# Patient Record
Sex: MALE | Race: WHITE | Employment: FULL TIME | URBAN - METROPOLITAN AREA
[De-identification: names, ages, dates, MRNs, and addresses within clinical notes are randomized per-mention and may not be internally consistent; named-entity substitution may affect disease eponyms.]

---

## 2017-02-06 ENCOUNTER — OFFICE VISIT (OUTPATIENT)
Dept: NEPHROLOGY | Facility: CLINIC | Age: 42
End: 2017-02-06

## 2017-02-06 VITALS
WEIGHT: 189.19 LBS | HEIGHT: 73.5 IN | SYSTOLIC BLOOD PRESSURE: 120 MMHG | HEART RATE: 71 BPM | BODY MASS INDEX: 24.54 KG/M2 | DIASTOLIC BLOOD PRESSURE: 79 MMHG

## 2017-02-06 DIAGNOSIS — Z00.00 HEALTH CARE MAINTENANCE: Primary | ICD-10-CM

## 2017-02-06 PROCEDURE — 99396 PREV VISIT EST AGE 40-64: CPT | Performed by: INTERNAL MEDICINE

## 2017-02-06 RX ORDER — MONTELUKAST SODIUM 10 MG/1
TABLET ORAL
Qty: 90 TABLET | Refills: 1 | Status: SHIPPED | OUTPATIENT
Start: 2017-02-06 | End: 2017-08-08

## 2017-02-06 NOTE — PATIENT INSTRUCTIONS
1. Good to see you Mariya Lora     2. Please get labs fasting    3. I called in singulair    4. See me one year. Olga Duffy

## 2017-02-07 NOTE — PROGRESS NOTES
NEPHROLOGY PROGRESS NOTE  Cornelio Morales     MRN:  [id number]   Date of Service:  2/6/17     HISTORY OF PRESENT ILLNESS: Audrey Vasquez is here for yearly physical. He is a healthy 48-year-old male. He is a nurse. He has history of asthma.  There is colon cancer

## 2017-02-27 ENCOUNTER — APPOINTMENT (OUTPATIENT)
Dept: LAB | Age: 42
End: 2017-02-27
Attending: INTERNAL MEDICINE
Payer: COMMERCIAL

## 2017-02-27 ENCOUNTER — TELEPHONE (OUTPATIENT)
Dept: NEPHROLOGY | Facility: CLINIC | Age: 42
End: 2017-02-27

## 2017-02-27 DIAGNOSIS — Z00.00 HEALTH CARE MAINTENANCE: ICD-10-CM

## 2017-02-27 LAB
ALBUMIN SERPL BCP-MCNC: 4.5 G/DL (ref 3.5–4.8)
ALBUMIN/GLOB SERPL: 1.5 {RATIO} (ref 1–2)
ALP SERPL-CCNC: 75 U/L (ref 32–100)
ALT SERPL-CCNC: 42 U/L (ref 17–63)
ANION GAP SERPL CALC-SCNC: 10 MMOL/L (ref 0–18)
AST SERPL-CCNC: 25 U/L (ref 15–41)
BASOPHILS # BLD: 0 K/UL (ref 0–0.2)
BASOPHILS NFR BLD: 1 %
BILIRUB SERPL-MCNC: 1 MG/DL (ref 0.3–1.2)
BILIRUB UR QL: NEGATIVE
BUN SERPL-MCNC: 6 MG/DL (ref 8–20)
BUN/CREAT SERPL: 8.8 (ref 10–20)
CALCIUM SERPL-MCNC: 9.7 MG/DL (ref 8.5–10.5)
CHLORIDE SERPL-SCNC: 98 MMOL/L (ref 95–110)
CHOLEST SERPL-MCNC: 180 MG/DL (ref 110–200)
CLARITY UR: CLEAR
CO2 SERPL-SCNC: 28 MMOL/L (ref 22–32)
COLOR UR: YELLOW
CREAT SERPL-MCNC: 0.68 MG/DL (ref 0.5–1.5)
EOSINOPHIL # BLD: 0 K/UL (ref 0–0.7)
EOSINOPHIL NFR BLD: 1 %
ERYTHROCYTE [DISTWIDTH] IN BLOOD BY AUTOMATED COUNT: 13.4 % (ref 11–15)
GLOBULIN PLAS-MCNC: 3.1 G/DL (ref 2.5–3.7)
GLUCOSE SERPL-MCNC: 100 MG/DL (ref 70–99)
GLUCOSE UR-MCNC: NEGATIVE MG/DL
HCT VFR BLD AUTO: 48.1 % (ref 41–52)
HDLC SERPL-MCNC: 44 MG/DL
HGB BLD-MCNC: 16.3 G/DL (ref 13.5–17.5)
HGB UR QL STRIP.AUTO: NEGATIVE
KETONES UR-MCNC: NEGATIVE MG/DL
LDLC SERPL CALC-MCNC: 91 MG/DL (ref 0–99)
LEUKOCYTE ESTERASE UR QL STRIP.AUTO: NEGATIVE
LYMPHOCYTES # BLD: 1.2 K/UL (ref 1–4)
LYMPHOCYTES NFR BLD: 15 %
MCH RBC QN AUTO: 29.4 PG (ref 27–32)
MCHC RBC AUTO-ENTMCNC: 33.9 G/DL (ref 32–37)
MCV RBC AUTO: 86.7 FL (ref 80–100)
MONOCYTES # BLD: 0.6 K/UL (ref 0–1)
MONOCYTES NFR BLD: 7 %
NEUTROPHILS # BLD AUTO: 5.9 K/UL (ref 1.8–7.7)
NEUTROPHILS NFR BLD: 77 %
NITRITE UR QL STRIP.AUTO: NEGATIVE
NONHDLC SERPL-MCNC: 136 MG/DL
OSMOLALITY UR CALC.SUM OF ELEC: 280 MOSM/KG (ref 275–295)
PH UR: 5 [PH] (ref 5–8)
PLATELET # BLD AUTO: 223 K/UL (ref 140–400)
PMV BLD AUTO: 8.7 FL (ref 7.4–10.3)
POTASSIUM SERPL-SCNC: 3.7 MMOL/L (ref 3.3–5.1)
PROT SERPL-MCNC: 7.6 G/DL (ref 5.9–8.4)
PROT UR-MCNC: NEGATIVE MG/DL
PSA SERPL-MCNC: 0.5 NG/ML (ref 0–4)
RBC # BLD AUTO: 5.55 M/UL (ref 4.5–5.9)
SODIUM SERPL-SCNC: 136 MMOL/L (ref 136–144)
SP GR UR STRIP: 1.01 (ref 1–1.03)
TRIGL SERPL-MCNC: 225 MG/DL (ref 1–149)
UROBILINOGEN UR STRIP-ACNC: <2
VIT C UR-MCNC: NEGATIVE MG/DL
WBC # BLD AUTO: 7.7 K/UL (ref 4–11)

## 2017-02-27 PROCEDURE — 80061 LIPID PANEL: CPT | Performed by: INTERNAL MEDICINE

## 2017-02-27 PROCEDURE — 81003 URINALYSIS AUTO W/O SCOPE: CPT | Performed by: INTERNAL MEDICINE

## 2017-02-27 PROCEDURE — 85025 COMPLETE CBC W/AUTO DIFF WBC: CPT | Performed by: INTERNAL MEDICINE

## 2017-02-27 PROCEDURE — 80053 COMPREHEN METABOLIC PANEL: CPT | Performed by: INTERNAL MEDICINE

## 2017-02-27 PROCEDURE — 36415 COLL VENOUS BLD VENIPUNCTURE: CPT

## 2017-02-27 RX ORDER — SIMVASTATIN 20 MG
TABLET ORAL
Qty: 90 TABLET | Refills: 1 | Status: SHIPPED | OUTPATIENT
Start: 2017-02-27 | End: 2017-08-30

## 2017-02-27 NOTE — TELEPHONE ENCOUNTER
Pt. states that he is missing page 3 of his lab orders, and he would like to get his labs done today at [address] [place] [address]. Please call to discuss.

## 2017-03-14 ENCOUNTER — TELEPHONE (OUTPATIENT)
Dept: NEPHROLOGY | Facility: CLINIC | Age: 42
End: 2017-03-14

## 2017-04-17 RX ORDER — LISINOPRIL AND HYDROCHLOROTHIAZIDE 20; 12.5 MG/1; MG/1
TABLET ORAL
Qty: 90 TABLET | Refills: 0 | Status: SHIPPED | OUTPATIENT
Start: 2017-04-17 | End: 2017-07-21

## 2017-07-21 RX ORDER — LISINOPRIL AND HYDROCHLOROTHIAZIDE 20; 12.5 MG/1; MG/1
TABLET ORAL
Qty: 90 TABLET | Refills: 1 | Status: SHIPPED | OUTPATIENT
Start: 2017-07-21 | End: 2018-01-12

## 2017-08-08 RX ORDER — MONTELUKAST SODIUM 10 MG/1
TABLET ORAL
Qty: 90 TABLET | Refills: 1 | Status: SHIPPED | OUTPATIENT
Start: 2017-08-08 | End: 2018-02-03

## 2017-08-30 RX ORDER — SIMVASTATIN 20 MG
TABLET ORAL
Qty: 90 TABLET | Refills: 1 | Status: SHIPPED | OUTPATIENT
Start: 2017-08-30 | End: 2018-02-26

## 2018-01-15 RX ORDER — LISINOPRIL AND HYDROCHLOROTHIAZIDE 20; 12.5 MG/1; MG/1
TABLET ORAL
Qty: 90 TABLET | Refills: 1 | Status: SHIPPED | OUTPATIENT
Start: 2018-01-15 | End: 2018-07-11

## 2018-02-05 RX ORDER — MONTELUKAST SODIUM 10 MG/1
TABLET ORAL
Qty: 90 TABLET | Refills: 0 | Status: SHIPPED | OUTPATIENT
Start: 2018-02-05 | End: 2018-05-10

## 2018-02-05 NOTE — TELEPHONE ENCOUNTER
LOV 2/6/17. Has upcoming appt on 3/15/18. Rx refilled per [facility] protocol in his absence.

## 2018-02-27 RX ORDER — SIMVASTATIN 20 MG
TABLET ORAL
Qty: 90 TABLET | Refills: 0 | Status: SHIPPED | OUTPATIENT
Start: 2018-02-27 | End: 2018-06-02

## 2018-03-15 ENCOUNTER — OFFICE VISIT (OUTPATIENT)
Dept: NEPHROLOGY | Facility: CLINIC | Age: 43
End: 2018-03-15

## 2018-03-15 VITALS
WEIGHT: 192 LBS | SYSTOLIC BLOOD PRESSURE: 126 MMHG | HEIGHT: 73 IN | BODY MASS INDEX: 25.45 KG/M2 | DIASTOLIC BLOOD PRESSURE: 76 MMHG | HEART RATE: 58 BPM

## 2018-03-15 DIAGNOSIS — Z00.00 ROUTINE ADULT HEALTH MAINTENANCE: Primary | ICD-10-CM

## 2018-03-15 PROCEDURE — 99396 PREV VISIT EST AGE 40-64: CPT | Performed by: INTERNAL MEDICINE

## 2018-03-15 NOTE — PROGRESS NOTES
Tatyana Cotter is here for his yearly checkup is 36years old he is recently lost 3 people in his life but they were stepparents and grandmother.   He is feeling good he is working at [employer] has no specific complaints  Denies any chest pain shortness of

## 2018-03-15 NOTE — PATIENT INSTRUCTIONS
Good to see you Volanda Paget   good job w everything     see me one year     see Dermatology   At [facility]      Get labs fasting same day. Feng Ledesma

## 2018-03-29 ENCOUNTER — LAB ENCOUNTER (OUTPATIENT)
Dept: LAB | Age: 43
End: 2018-03-29
Attending: INTERNAL MEDICINE
Payer: COMMERCIAL

## 2018-03-29 DIAGNOSIS — Z00.00 ROUTINE ADULT HEALTH MAINTENANCE: ICD-10-CM

## 2018-03-29 LAB
ALBUMIN SERPL BCP-MCNC: 4.2 G/DL (ref 3.5–4.8)
ALBUMIN/GLOB SERPL: 1.5 {RATIO} (ref 1–2)
ALP SERPL-CCNC: 66 U/L (ref 32–100)
ALT SERPL-CCNC: 27 U/L (ref 17–63)
ANION GAP SERPL CALC-SCNC: 7 MMOL/L (ref 0–18)
AST SERPL-CCNC: 22 U/L (ref 15–41)
BASOPHILS # BLD: 0 K/UL (ref 0–0.2)
BASOPHILS NFR BLD: 1 %
BILIRUB SERPL-MCNC: 0.9 MG/DL (ref 0.3–1.2)
BILIRUB UR QL: NEGATIVE
BUN SERPL-MCNC: 9 MG/DL (ref 8–20)
BUN/CREAT SERPL: 11.3 (ref 10–20)
CALCIUM SERPL-MCNC: 9.4 MG/DL (ref 8.5–10.5)
CHLORIDE SERPL-SCNC: 102 MMOL/L (ref 95–110)
CHOLEST SERPL-MCNC: 161 MG/DL (ref 110–200)
CLARITY UR: CLEAR
CO2 SERPL-SCNC: 29 MMOL/L (ref 22–32)
COLOR UR: YELLOW
CREAT SERPL-MCNC: 0.8 MG/DL (ref 0.5–1.5)
EOSINOPHIL # BLD: 0.1 K/UL (ref 0–0.7)
EOSINOPHIL NFR BLD: 3 %
ERYTHROCYTE [DISTWIDTH] IN BLOOD BY AUTOMATED COUNT: 13.3 % (ref 11–15)
GLOBULIN PLAS-MCNC: 2.8 G/DL (ref 2.5–3.7)
GLUCOSE SERPL-MCNC: 91 MG/DL (ref 70–99)
GLUCOSE UR-MCNC: NEGATIVE MG/DL
HCT VFR BLD AUTO: 46 % (ref 41–52)
HDLC SERPL-MCNC: 50 MG/DL
HGB BLD-MCNC: 16 G/DL (ref 13.5–17.5)
HGB UR QL STRIP.AUTO: NEGATIVE
KETONES UR-MCNC: NEGATIVE MG/DL
LDLC SERPL CALC-MCNC: 88 MG/DL (ref 0–99)
LEUKOCYTE ESTERASE UR QL STRIP.AUTO: NEGATIVE
LYMPHOCYTES # BLD: 1.4 K/UL (ref 1–4)
LYMPHOCYTES NFR BLD: 24 %
MCH RBC QN AUTO: 30.1 PG (ref 27–32)
MCHC RBC AUTO-ENTMCNC: 34.8 G/DL (ref 32–37)
MCV RBC AUTO: 86.5 FL (ref 80–100)
MONOCYTES # BLD: 0.7 K/UL (ref 0–1)
MONOCYTES NFR BLD: 12 %
NEUTROPHILS # BLD AUTO: 3.4 K/UL (ref 1.8–7.7)
NEUTROPHILS NFR BLD: 61 %
NITRITE UR QL STRIP.AUTO: NEGATIVE
NONHDLC SERPL-MCNC: 111 MG/DL
OSMOLALITY UR CALC.SUM OF ELEC: 284 MOSM/KG (ref 275–295)
PATIENT FASTING: YES
PH UR: 6 [PH] (ref 5–8)
PLATELET # BLD AUTO: 213 K/UL (ref 140–400)
PMV BLD AUTO: 9.1 FL (ref 7.4–10.3)
POTASSIUM SERPL-SCNC: 4 MMOL/L (ref 3.3–5.1)
PROT SERPL-MCNC: 7 G/DL (ref 5.9–8.4)
PROT UR-MCNC: NEGATIVE MG/DL
PSA SERPL-MCNC: 0.5 NG/ML (ref 0–4)
RBC # BLD AUTO: 5.32 M/UL (ref 4.5–5.9)
SODIUM SERPL-SCNC: 138 MMOL/L (ref 136–144)
SP GR UR STRIP: 1.01 (ref 1–1.03)
TRIGL SERPL-MCNC: 114 MG/DL (ref 1–149)
TSH SERPL-ACNC: 1.97 UIU/ML (ref 0.45–5.33)
UROBILINOGEN UR STRIP-ACNC: <2
VIT C UR-MCNC: NEGATIVE MG/DL
WBC # BLD AUTO: 5.6 K/UL (ref 4–11)

## 2018-03-29 PROCEDURE — 85025 COMPLETE CBC W/AUTO DIFF WBC: CPT

## 2018-03-29 PROCEDURE — 80061 LIPID PANEL: CPT

## 2018-03-29 PROCEDURE — 81003 URINALYSIS AUTO W/O SCOPE: CPT

## 2018-03-29 PROCEDURE — 36415 COLL VENOUS BLD VENIPUNCTURE: CPT

## 2018-03-29 PROCEDURE — 84443 ASSAY THYROID STIM HORMONE: CPT

## 2018-03-29 PROCEDURE — 80050 GENERAL HEALTH PANEL: CPT

## 2018-05-11 RX ORDER — MONTELUKAST SODIUM 10 MG/1
TABLET ORAL
Qty: 90 TABLET | Refills: 3 | Status: SHIPPED | OUTPATIENT
Start: 2018-05-11 | End: 2019-05-03

## 2018-06-04 RX ORDER — SIMVASTATIN 20 MG
TABLET ORAL
Qty: 90 TABLET | Refills: 1 | Status: SHIPPED | OUTPATIENT
Start: 2018-06-04 | End: 2018-11-30

## 2018-07-11 RX ORDER — LISINOPRIL AND HYDROCHLOROTHIAZIDE 20; 12.5 MG/1; MG/1
TABLET ORAL
Qty: 90 TABLET | Refills: 1 | Status: SHIPPED | OUTPATIENT
Start: 2018-07-11 | End: 2019-01-19

## 2018-12-03 RX ORDER — SIMVASTATIN 20 MG
TABLET ORAL
Qty: 90 TABLET | Refills: 1 | Status: SHIPPED | OUTPATIENT
Start: 2018-12-03 | End: 2019-05-15

## 2019-01-21 RX ORDER — LISINOPRIL AND HYDROCHLOROTHIAZIDE 20; 12.5 MG/1; MG/1
TABLET ORAL
Qty: 90 TABLET | Refills: 0 | Status: SHIPPED | OUTPATIENT
Start: 2019-01-21 | End: 2019-04-16

## 2019-03-15 ENCOUNTER — OFFICE VISIT (OUTPATIENT)
Dept: NEPHROLOGY | Facility: CLINIC | Age: 44
End: 2019-03-15
Payer: COMMERCIAL

## 2019-03-15 VITALS
HEIGHT: 73 IN | SYSTOLIC BLOOD PRESSURE: 135 MMHG | BODY MASS INDEX: 25.58 KG/M2 | HEART RATE: 72 BPM | DIASTOLIC BLOOD PRESSURE: 87 MMHG | WEIGHT: 193 LBS

## 2019-03-15 DIAGNOSIS — Z00.00 HEALTHCARE MAINTENANCE: Primary | ICD-10-CM

## 2019-03-15 PROCEDURE — 99396 PREV VISIT EST AGE 40-64: CPT | Performed by: INTERNAL MEDICINE

## 2019-03-15 NOTE — PATIENT INSTRUCTIONS
GREAT JOB KVNG    SEE ME ONE YEAR     KEEP UP GOOD WORK/    TRY FLONASE RATHER THAN AFRIN FOR NOSE      DO LABS FASTING

## 2019-03-15 NOTE — PROGRESS NOTES
Tameka Alfredo is here and is doing well he has no complaints he is a healthy 41-year-old white male he is up-to-date with immunizations he works as a nurse denies any bowel or bladder issues chest pain shortness of breath urinary issues or skin issues denies chest

## 2019-04-04 ENCOUNTER — LAB ENCOUNTER (OUTPATIENT)
Dept: LAB | Age: 44
End: 2019-04-04
Attending: INTERNAL MEDICINE
Payer: COMMERCIAL

## 2019-04-04 DIAGNOSIS — Z00.00 HEALTHCARE MAINTENANCE: ICD-10-CM

## 2019-04-16 RX ORDER — LISINOPRIL AND HYDROCHLOROTHIAZIDE 20; 12.5 MG/1; MG/1
TABLET ORAL
Qty: 90 TABLET | Refills: 3 | Status: SHIPPED | OUTPATIENT
Start: 2019-04-16 | End: 2020-04-21

## 2019-04-17 ENCOUNTER — TELEPHONE (OUTPATIENT)
Dept: NEPHROLOGY | Facility: CLINIC | Age: 44
End: 2019-04-17

## 2019-04-17 DIAGNOSIS — R74.01 TRANSAMINASEMIA: Primary | ICD-10-CM

## 2019-05-07 RX ORDER — MONTELUKAST SODIUM 10 MG/1
TABLET ORAL
Qty: 90 TABLET | Refills: 3 | Status: SHIPPED | OUTPATIENT
Start: 2019-05-07 | End: 2020-06-08

## 2019-05-15 RX ORDER — SIMVASTATIN 20 MG
TABLET ORAL
Qty: 90 TABLET | Refills: 1 | Status: SHIPPED | OUTPATIENT
Start: 2019-05-15 | End: 2019-12-09

## 2019-05-15 NOTE — TELEPHONE ENCOUNTER
LOV 3/15/19. Last lipid panel was done on 4/4/19. Refill pended and routed to Dr. Alexander Martino.

## 2019-05-31 ENCOUNTER — TELEPHONE (OUTPATIENT)
Dept: NEPHROLOGY | Facility: CLINIC | Age: 44
End: 2019-05-31

## 2019-10-19 ENCOUNTER — WALK IN (OUTPATIENT)
Dept: URGENT CARE | Age: 44
End: 2019-10-19

## 2019-10-19 VITALS
OXYGEN SATURATION: 97 % | HEART RATE: 85 BPM | RESPIRATION RATE: 18 BRPM | TEMPERATURE: 97.7 F | DIASTOLIC BLOOD PRESSURE: 72 MMHG | SYSTOLIC BLOOD PRESSURE: 120 MMHG

## 2019-10-19 DIAGNOSIS — J06.9 VIRAL UPPER RESPIRATORY TRACT INFECTION: Primary | ICD-10-CM

## 2019-10-19 PROCEDURE — 99203 OFFICE O/P NEW LOW 30 MIN: CPT | Performed by: NURSE PRACTITIONER

## 2019-10-19 RX ORDER — BENZONATATE 100 MG/1
100 CAPSULE ORAL 3 TIMES DAILY PRN
Qty: 15 CAPSULE | Refills: 0 | Status: SHIPPED | OUTPATIENT
Start: 2019-10-19 | End: 2019-10-24

## 2019-10-19 ASSESSMENT — ENCOUNTER SYMPTOMS
SORE THROAT: 0
GASTROINTESTINAL NEGATIVE: 1
SINUS PAIN: 0
TROUBLE SWALLOWING: 0
PSYCHIATRIC NEGATIVE: 1
FEVER: 0
EYE PAIN: 0
SINUS PRESSURE: 0
COUGH: 1
FATIGUE: 0
CHILLS: 0
SHORTNESS OF BREATH: 0

## 2019-10-24 ENCOUNTER — HOSPITAL ENCOUNTER (OUTPATIENT)
Age: 44
Discharge: HOME | End: 2019-10-24
Attending: FAMILY MEDICINE
Payer: COMMERCIAL

## 2019-10-24 ENCOUNTER — APPOINTMENT (OUTPATIENT)
Dept: GENERAL RADIOLOGY | Age: 44
End: 2019-10-24
Attending: FAMILY MEDICINE
Payer: COMMERCIAL

## 2019-10-24 ENCOUNTER — TELEPHONE (OUTPATIENT)
Dept: NEPHROLOGY | Facility: CLINIC | Age: 44
End: 2019-10-24

## 2019-10-24 VITALS
HEART RATE: 87 BPM | SYSTOLIC BLOOD PRESSURE: 141 MMHG | HEIGHT: 74 IN | TEMPERATURE: 98 F | BODY MASS INDEX: 24.38 KG/M2 | RESPIRATION RATE: 18 BRPM | WEIGHT: 190 LBS | DIASTOLIC BLOOD PRESSURE: 98 MMHG | OXYGEN SATURATION: 96 %

## 2019-10-24 DIAGNOSIS — J06.9 VIRAL UPPER RESPIRATORY TRACT INFECTION: Primary | ICD-10-CM

## 2019-10-24 PROCEDURE — 71046 X-RAY EXAM CHEST 2 VIEWS: CPT | Performed by: FAMILY MEDICINE

## 2019-10-24 PROCEDURE — 99213 OFFICE O/P EST LOW 20 MIN: CPT

## 2019-10-24 PROCEDURE — 99204 OFFICE O/P NEW MOD 45 MIN: CPT

## 2019-10-24 RX ORDER — ALBUTEROL SULFATE 90 UG/1
2 AEROSOL, METERED RESPIRATORY (INHALATION) EVERY 4 HOURS PRN
Qty: 1 INHALER | Refills: 1 | Status: SHIPPED | OUTPATIENT
Start: 2019-10-24 | End: 2019-11-23

## 2019-10-24 RX ORDER — METHYLPREDNISOLONE 4 MG/1
TABLET ORAL
Qty: 1 PACKAGE | Refills: 0 | Status: SHIPPED | OUTPATIENT
Start: 2019-10-24 | End: 2019-11-05 | Stop reason: ALTCHOICE

## 2019-10-24 NOTE — TELEPHONE ENCOUNTER
Patient contacted. Was sick 2 weeks ago but still has a cough that continues.  Yellow secretions and admits to having chest tightness and is short of breath when walking up stairs (exertion) Using Delsym cough medication which helps but just doesn't feel we

## 2019-10-24 NOTE — ED PROVIDER NOTES
Patient Seen in: [address]      History   Patient presents with:  Cough/URI    Stated Complaint: cough; fever; congestion; sob    HPI    36yo M presents to IC with 2 weeks of cough.  Had myalgias and fevers at the onset Physical Exam  Vitals signs and nursing note reviewed. Constitutional:       General: He is not in acute distress. Appearance: He is not ill-appearing, toxic-appearing or diaphoretic.       Comments: NAD, nontoxic, breathing easily   HENT: viral process versus bronchiolitis.  No acute pulmonary consolidation           Dictated by (CST): Carroll Barrera MD on 10/24/2019 at 14:43       Approved by (CST): Carroll Barrera MD on 10/24/2019 at 14:46                    Narrative:    Abdoul Yarbrough Wheezing.   Qty: 1 Inhaler Refills: 1

## 2019-10-24 NOTE — TELEPHONE ENCOUNTER
Patient contacted with Dr. Rodney Cates advice. Patient is currently at work but did note that there is an [facility] [place] in [facility] and he will go there to be evaluated.

## 2019-10-24 NOTE — ED INITIAL ASSESSMENT (HPI)
Pt states a few weeks ago had a cold pt states having body aches at the time. Pt states has since subsided but now having a cough with chest congestion.  Pt went to [address] last Saturday was given cough suppressant but told to follow up if cough persist. Pt sta

## 2019-11-04 ENCOUNTER — TELEPHONE (OUTPATIENT)
Dept: NEPHROLOGY | Facility: CLINIC | Age: 44
End: 2019-11-04

## 2019-11-04 NOTE — TELEPHONE ENCOUNTER
Contacted pt. He was seen in immediate care on 10/24/19 and prescribed a medrol dose pack and albuterol inhaler. He finished medrol dose pack and is still using albuterol inhaler PRN.  He has a productive cough and if he's walking long distances or up stair

## 2019-11-04 NOTE — TELEPHONE ENCOUNTER
Pt states that he went to [facility] on 10//24/19 and was given RX for steroids/albuterol inhaler. Pt still has cough w/yellow phlegm. Pt would like to know what he should do. Please call. Aware [facility] out office.

## 2019-11-05 ENCOUNTER — TELEPHONE (OUTPATIENT)
Dept: INTERNAL MEDICINE CLINIC | Facility: CLINIC | Age: 44
End: 2019-11-05

## 2019-11-05 ENCOUNTER — OFFICE VISIT (OUTPATIENT)
Dept: FAMILY MEDICINE CLINIC | Facility: CLINIC | Age: 44
End: 2019-11-05
Payer: COMMERCIAL

## 2019-11-05 VITALS
SYSTOLIC BLOOD PRESSURE: 112 MMHG | BODY MASS INDEX: 25.18 KG/M2 | TEMPERATURE: 97 F | DIASTOLIC BLOOD PRESSURE: 85 MMHG | HEART RATE: 86 BPM | HEIGHT: 73 IN | WEIGHT: 190 LBS | OXYGEN SATURATION: 97 % | RESPIRATION RATE: 20 BRPM

## 2019-11-05 DIAGNOSIS — R05.8 PRODUCTIVE COUGH: Primary | ICD-10-CM

## 2019-11-05 PROCEDURE — 99213 OFFICE O/P EST LOW 20 MIN: CPT | Performed by: FAMILY MEDICINE

## 2019-11-05 RX ORDER — AZITHROMYCIN 250 MG/1
TABLET, FILM COATED ORAL
Qty: 6 TABLET | Refills: 0 | Status: SHIPPED | OUTPATIENT
Start: 2019-11-05 | End: 2020-11-23

## 2019-11-05 NOTE — TELEPHONE ENCOUNTER
New patient states he has been having difficulty breathing as well as coughing.     Transferred to Triage

## 2019-11-05 NOTE — PROGRESS NOTES
HPI:    Lord Clifford is a 39year old male presents to clinic with a 4-week history of symptoms. Initially, patient reports fevers, congestion, and fatigue.   He then developed a cough which started out dry, is now productive with thick yellow phl BP: 112/85   Pulse: 86   Resp: 20   Temp: 97.4 °F (36.3 °C)   TempSrc: Oral   SpO2: 97%   Weight: 190 lb (86.2 kg)   Height: 6' 1\" (1.854 m)     Physical Exam   Constitutional: No distress. HENT:   Head: Normocephalic and atraumatic.    Neck: Normal ra

## 2019-11-05 NOTE — TELEPHONE ENCOUNTER
Reason for Call/Symptoms: Cough/Congestion  Onset: 2 Weeks  Courtesy Assessment: Patient reports ongoing cough/congestion with yellow phlegm. Has wheezing with extended activity, walking, using stairs. Was seen in [facility] 10/24 CXR normal, put on steroids.  Ilene Gil

## 2019-12-09 RX ORDER — SIMVASTATIN 20 MG
TABLET ORAL
Qty: 90 TABLET | Refills: 4 | Status: SHIPPED | OUTPATIENT
Start: 2019-12-09 | End: 2020-12-15

## 2019-12-09 NOTE — TELEPHONE ENCOUNTER
LOV 3/15/19. Last lipid panel was done on 4/4/19.  RTC in 1 yr (3/2020) Refill pended and routed to Dr. Gregorio Maddox for approval.

## 2020-01-20 ENCOUNTER — TELEPHONE (OUTPATIENT)
Dept: NEPHROLOGY | Facility: CLINIC | Age: 45
End: 2020-01-20

## 2020-01-20 RX ORDER — LEVOFLOXACIN 500 MG/1
500 TABLET, FILM COATED ORAL DAILY
Qty: 7 TABLET | Refills: 0 | Status: SHIPPED | OUTPATIENT
Start: 2020-01-20 | End: 2020-11-23

## 2020-01-20 NOTE — TELEPHONE ENCOUNTER
Pt notified that [facility] has called in 1 week course of Levaquin and to call office if symptoms do not improve.

## 2020-01-20 NOTE — TELEPHONE ENCOUNTER
Pt returned call. Reports productive cough with yellow and brown chunky phlegm and congestion. Onset 1 week ago. Feels like symptoms migrated from sinuses to chest. Denies wheezing, SOB, or difficulty breathing. Denies fever.  Had similar symptoms in Novemb

## 2020-01-29 ENCOUNTER — TELEPHONE (OUTPATIENT)
Dept: NEPHROLOGY | Facility: CLINIC | Age: 45
End: 2020-01-29

## 2020-01-30 RX ORDER — PREDNISONE 20 MG/1
20 TABLET ORAL 2 TIMES DAILY
Qty: 10 TABLET | Refills: 0 | Status: SHIPPED | OUTPATIENT
Start: 2020-01-30 | End: 2020-02-04

## 2020-02-10 ENCOUNTER — OFFICE VISIT (OUTPATIENT)
Dept: NEPHROLOGY | Facility: CLINIC | Age: 45
End: 2020-02-10
Payer: COMMERCIAL

## 2020-02-10 ENCOUNTER — HOSPITAL ENCOUNTER (OUTPATIENT)
Dept: GENERAL RADIOLOGY | Facility: HOSPITAL | Age: 45
Discharge: HOME | End: 2020-02-10
Attending: INTERNAL MEDICINE
Payer: COMMERCIAL

## 2020-02-10 VITALS
HEART RATE: 69 BPM | WEIGHT: 200 LBS | HEIGHT: 73 IN | DIASTOLIC BLOOD PRESSURE: 90 MMHG | BODY MASS INDEX: 26.51 KG/M2 | TEMPERATURE: 98 F | SYSTOLIC BLOOD PRESSURE: 145 MMHG

## 2020-02-10 DIAGNOSIS — J18.9 PNEUMONIA DUE TO INFECTIOUS ORGANISM, UNSPECIFIED LATERALITY, UNSPECIFIED PART OF LUNG: Primary | ICD-10-CM

## 2020-02-10 DIAGNOSIS — J18.9 PNEUMONIA DUE TO INFECTIOUS ORGANISM, UNSPECIFIED LATERALITY, UNSPECIFIED PART OF LUNG: ICD-10-CM

## 2020-02-10 PROCEDURE — 99213 OFFICE O/P EST LOW 20 MIN: CPT | Performed by: INTERNAL MEDICINE

## 2020-02-10 PROCEDURE — 99212 OFFICE O/P EST SF 10 MIN: CPT | Performed by: INTERNAL MEDICINE

## 2020-02-10 PROCEDURE — 71046 X-RAY EXAM CHEST 2 VIEWS: CPT | Performed by: INTERNAL MEDICINE

## 2020-02-10 RX ORDER — LEVOFLOXACIN 500 MG/1
500 TABLET, FILM COATED ORAL DAILY
Qty: 10 TABLET | Refills: 0 | Status: SHIPPED | OUTPATIENT
Start: 2020-02-10 | End: 2020-11-23

## 2020-02-10 RX ORDER — CODEINE PHOSPHATE AND GUAIFENESIN 10; 100 MG/5ML; MG/5ML
5 SOLUTION ORAL EVERY 6 HOURS PRN
Qty: 120 ML | Refills: 1 | Status: SHIPPED | OUTPATIENT
Start: 2020-02-10 | End: 2020-11-23

## 2020-02-12 ENCOUNTER — TELEPHONE (OUTPATIENT)
Dept: NEPHROLOGY | Facility: CLINIC | Age: 45
End: 2020-02-12

## 2020-02-12 NOTE — TELEPHONE ENCOUNTER
Spoke with pt and verbalizes understanding of Dr. Terrance Russell note. Pt states he still feels sick. Advised pt to take antibiotics and cough meds as prescribed and he can call us again next week if he's not feeling any better.

## 2020-02-13 NOTE — PROGRESS NOTES
Lindsay Benedict is here he has been having recurrent bronchitis he is taking a Z-Rafi and steroids but no good relief no fever no shortness of breath coughing up green thick mucus    Currently afebrile pressure 145/90  Room air saturation 98%  Neck supple no adenop

## 2020-03-02 ENCOUNTER — TELEPHONE (OUTPATIENT)
Dept: NEPHROLOGY | Facility: CLINIC | Age: 45
End: 2020-03-02

## 2020-03-02 NOTE — TELEPHONE ENCOUNTER
Discussed Dr. Cam Ahumada orders w/ pt. Scheduled pt 3/5 1:15 pm [facility]. Appt info including provider name given. He voiced understanding. Dr. Violeta Hua- Shellie Moctezuma.

## 2020-03-02 NOTE — TELEPHONE ENCOUNTER
Routed to Pulmonary staff to see about an appointment for this patient at Dr. Randal Oliveira request.

## 2020-03-02 NOTE — TELEPHONE ENCOUNTER
Patient has had a cough for more than 1 month and has had several rounds of antibiotics.   Patient wondering if there is something else he can take/it's just not going away

## 2020-03-02 NOTE — TELEPHONE ENCOUNTER
Discussed Dr. Ana Correia orders below w/ pt. He declined appt on 3/4 2:30 pm w/ Dr. Hayley Leyva d/t scheduling conflict (has to p/u his son from school).  Explained no other openings this wk, will discuss Dr. Ana Correia request w/ pulmonary physicians, & f/u once resp

## 2020-03-05 ENCOUNTER — OFFICE VISIT (OUTPATIENT)
Dept: PULMONOLOGY | Facility: CLINIC | Age: 45
End: 2020-03-05
Payer: COMMERCIAL

## 2020-03-05 VITALS
OXYGEN SATURATION: 96 % | BODY MASS INDEX: 26.24 KG/M2 | HEART RATE: 84 BPM | HEIGHT: 73 IN | WEIGHT: 198 LBS | DIASTOLIC BLOOD PRESSURE: 91 MMHG | RESPIRATION RATE: 18 BRPM | SYSTOLIC BLOOD PRESSURE: 136 MMHG

## 2020-03-05 DIAGNOSIS — R05.9 COUGH: Primary | ICD-10-CM

## 2020-03-05 PROCEDURE — 99212 OFFICE O/P EST SF 10 MIN: CPT | Performed by: INTERNAL MEDICINE

## 2020-03-05 PROCEDURE — 99203 OFFICE O/P NEW LOW 30 MIN: CPT | Performed by: INTERNAL MEDICINE

## 2020-03-05 RX ORDER — ALBUTEROL SULFATE 90 UG/1
AEROSOL, METERED RESPIRATORY (INHALATION)
Qty: 1 INHALER | Refills: 5 | Status: SHIPPED | OUTPATIENT
Start: 2020-03-05 | End: 2020-08-27

## 2020-03-05 RX ORDER — HYDROCODONE POLISTIREX AND CHLORPHENIRAMINE POLISTIREX 10; 8 MG/5ML; MG/5ML
5 SUSPENSION, EXTENDED RELEASE ORAL 2 TIMES DAILY PRN
Qty: 140 ML | Refills: 0 | Status: SHIPPED | OUTPATIENT
Start: 2020-03-05 | End: 2020-03-19

## 2020-03-05 RX ORDER — PREDNISONE 20 MG/1
TABLET ORAL
Qty: 10 TABLET | Refills: 0 | Status: SHIPPED | OUTPATIENT
Start: 2020-03-05 | End: 2020-11-23

## 2020-03-05 NOTE — PROGRESS NOTES
Dear Gilbert Mcnamara:           As you know, Mr. Dionisio Da Silva is a 58-year-old male who I am now evaluating for cough.        HISTORY OF PRESENT ILLNESS: The patient notes that he had a bad cough syndrome last fall necessitating antibiotics and a chest x-ray w grossly intact with symmetric tone and strength and reflex. LABORATORY: Chest x-ray–unremarkable    ASSESSMENT AND PLAN:  PROBLEM 1.   Chronic cough–my strong suspicion is that the patient has a post viral airway hyperreactivity with rattling in the ches

## 2020-03-18 ENCOUNTER — TELEPHONE (OUTPATIENT)
Dept: NEPHROLOGY | Facility: CLINIC | Age: 45
End: 2020-03-18

## 2020-03-18 NOTE — TELEPHONE ENCOUNTER
He should not ocme in with a cough   he was also told to see pulmonary   he has bad bronchitis  Doubt covid

## 2020-03-18 NOTE — TELEPHONE ENCOUNTER
Patient contacted. He said he already saw Dr. Yi Rosen on 3/5/2020. He was prescribed Prednisone, Albuteral inhaler. Has nasal congestion off and on. Cough is better but has  nasal congestion.  Dr. Violeta Hua said patient should take Advil Cold and Sinus for nasal

## 2020-04-06 ENCOUNTER — TELEPHONE (OUTPATIENT)
Dept: PULMONOLOGY | Facility: CLINIC | Age: 45
End: 2020-04-06

## 2020-04-06 RX ORDER — PREDNISONE 20 MG/1
TABLET ORAL
Qty: 10 TABLET | Refills: 0 | Status: SHIPPED | OUTPATIENT
Start: 2020-04-06 | End: 2020-04-13

## 2020-04-06 NOTE — TELEPHONE ENCOUNTER
Short course of prednisone sent to preferred pharmacy. Spoke with patient and he verbalizes understanding.

## 2020-04-06 NOTE — TELEPHONE ENCOUNTER
Dr. Belem Tomas, please advise,   Spoke with patient, OV 3/2/20 for chronic cough. States he's getting better overall but feels worse again in the past couple days. States congestion is back and he has productive chest cough.  Thinks congestion is still in bron

## 2020-04-15 ENCOUNTER — TELEPHONE (OUTPATIENT)
Dept: PULMONOLOGY | Facility: CLINIC | Age: 45
End: 2020-04-15

## 2020-04-15 RX ORDER — LEVOFLOXACIN 750 MG/1
750 TABLET ORAL DAILY
Qty: 10 TABLET | Refills: 1 | Status: SHIPPED | OUTPATIENT
Start: 2020-04-15 | End: 2020-11-23

## 2020-04-15 RX ORDER — PREDNISONE 20 MG/1
TABLET ORAL
Qty: 10 TABLET | Refills: 0 | Status: SHIPPED | OUTPATIENT
Start: 2020-04-15 | End: 2020-11-23

## 2020-04-15 NOTE — TELEPHONE ENCOUNTER
I spoke to the patient. The patient was prescribed Levaquin again and he has a short course prednisone ordered. He will call the office in a week to give an update.   If the clinical syndrome lingers, would pursue CT imaging to screen for segmental bronch

## 2020-04-15 NOTE — TELEPHONE ENCOUNTER
Spoke with patient. Pt informed of Dr. Argueta Core order below. Patient frustrated, stating something is wrong, coughing up \"yellow chunks. \"  Patient requesting to speak to Dr. Shyla Garrison to discuss plan of care. Dr. Shyla Garrison- Please call patient.

## 2020-04-15 NOTE — TELEPHONE ENCOUNTER
Spoke to patient states he's had cough for 3 months was prescribed prednisone twice which helped but last few day he's starting to feel congested and coughing with yellow sputum. Coughing mostly happens during the day. Patient feeling frustrated.  Denies fe

## 2020-04-21 RX ORDER — LISINOPRIL AND HYDROCHLOROTHIAZIDE 20; 12.5 MG/1; MG/1
TABLET ORAL
Qty: 90 TABLET | Refills: 3 | Status: SHIPPED | OUTPATIENT
Start: 2020-04-21 | End: 2020-06-03 | Stop reason: ALTCHOICE

## 2020-05-11 ENCOUNTER — TELEPHONE (OUTPATIENT)
Dept: PULMONOLOGY | Facility: CLINIC | Age: 45
End: 2020-05-11

## 2020-05-11 DIAGNOSIS — R05.3 CHRONIC COUGH: Primary | ICD-10-CM

## 2020-05-11 NOTE — TELEPHONE ENCOUNTER
Spoke with patient regarding message below. Patient states he is still having chest congestion/coughing with pale yellow sputum. Patient completed Levaquin in April (See TE 4/15/20), felt like it helped, cough started going away but is back again.  Denies f

## 2020-05-11 NOTE — TELEPHONE ENCOUNTER
Pt states he continues to have chest congestion and coughing. Pt states he was put on antibiotics in April and finished medication a few weeks ago.  Please call [phone]

## 2020-05-12 NOTE — TELEPHONE ENCOUNTER
Spoke with patient. Provided number for central scheduling [phone] and [facility] [phone] for prior authorization. Patient verbalized understanding.      Managed Care- Please obtain PA for CT Chest.

## 2020-05-13 ENCOUNTER — TELEPHONE (OUTPATIENT)
Dept: PULMONOLOGY | Facility: CLINIC | Age: 45
End: 2020-05-13

## 2020-05-13 RX ORDER — ALBUTEROL SULFATE 90 UG/1
2 AEROSOL, METERED RESPIRATORY (INHALATION) EVERY 4 HOURS PRN
Qty: 1 INHALER | Refills: 5 | Status: SHIPPED | OUTPATIENT
Start: 2020-05-13 | End: 2020-08-27

## 2020-05-13 NOTE — TELEPHONE ENCOUNTER
Patient states Flovent is too expensive and wanted to know if there was an alternate rx that can be prescribed. Please call. Thank you.

## 2020-05-13 NOTE — TELEPHONE ENCOUNTER
Spoke with pt whom states Flovent costs him $400 which is more than he can pay. Pt is requesting if MD can prescribe previous inhaler Albuterol he has been on in the past and feels it works well for him. Will forward to provider for review.

## 2020-05-26 ENCOUNTER — TELEPHONE (OUTPATIENT)
Dept: PULMONOLOGY | Facility: CLINIC | Age: 45
End: 2020-05-26

## 2020-05-26 NOTE — TELEPHONE ENCOUNTER
Patient called in stating that he has a order for a CT scan but he needs prior authorization from Dr. German Dutton to [facility]. Patient would also like if someone can contact him to give any follow up to let him know once it's approved.  Please advise

## 2020-05-29 ENCOUNTER — HOSPITAL ENCOUNTER (OUTPATIENT)
Dept: CT IMAGING | Facility: HOSPITAL | Age: 45
Discharge: HOME | End: 2020-05-29
Attending: PHYSICIAN ASSISTANT
Payer: COMMERCIAL

## 2020-05-29 DIAGNOSIS — R05.3 CHRONIC COUGH: ICD-10-CM

## 2020-05-29 PROCEDURE — 82565 ASSAY OF CREATININE: CPT

## 2020-05-29 PROCEDURE — 71260 CT THORAX DX C+: CPT | Performed by: PHYSICIAN ASSISTANT

## 2020-06-03 ENCOUNTER — TELEPHONE (OUTPATIENT)
Dept: PULMONOLOGY | Facility: CLINIC | Age: 45
End: 2020-06-03

## 2020-06-03 RX ORDER — LOSARTAN POTASSIUM 50 MG/1
50 TABLET ORAL DAILY
Qty: 30 TABLET | Refills: 6 | Status: SHIPPED | OUTPATIENT
Start: 2020-06-03 | End: 2020-11-23

## 2020-06-03 NOTE — TELEPHONE ENCOUNTER
----- Message from Chase Díaz MD sent at 5/29/2020  8:24 PM CDT -----  RN, I spoke with the patient regarding the results of his CAT scan the chest.  I am going to substitute his lisinopril for a different agent in case this is contributing to cough.

## 2020-06-03 NOTE — TELEPHONE ENCOUNTER
Spoke to patient regarding Dr. Tere Rodriguez message below. Verified pharmacy. Patient verbalized understanding. Dr. Kamille Mar to discontinue Lisinopril?

## 2020-06-03 NOTE — TELEPHONE ENCOUNTER
RN, okay to call the patient and please tell him that I have discussed his hypertension and cough at length with his primary care physician Dr. Balwinder Ahumada. It is okay to switch the lisinopril hydrochlorothiazide to losartan 50 mg p.o. daily.   He can ch

## 2020-06-03 NOTE — TELEPHONE ENCOUNTER
Spoke to patient, verified name and date of birth. Informed patient of Dr. Cindy Montes message below. Informed patient he can order Acapella breathing device online through [employer].  Informed patient insurances don't cover device and he would end up havin

## 2020-06-08 RX ORDER — MONTELUKAST SODIUM 10 MG/1
TABLET ORAL
Qty: 90 TABLET | Refills: 1 | Status: SHIPPED | OUTPATIENT
Start: 2020-06-08 | End: 2020-12-15

## 2020-07-06 ENCOUNTER — TELEPHONE (OUTPATIENT)
Dept: PULMONOLOGY | Facility: CLINIC | Age: 45
End: 2020-07-06

## 2020-07-07 NOTE — TELEPHONE ENCOUNTER
Spoke with patient regarding message below. Instructed patient dial on acapella has positive and negative sign, no set number for dial setting.  Informed patient moving dial toward plus sign will increase resistance and moving dial toward negative sign will

## 2020-08-21 ENCOUNTER — TELEPHONE (OUTPATIENT)
Dept: PULMONOLOGY | Facility: CLINIC | Age: 45
End: 2020-08-21

## 2020-08-21 NOTE — TELEPHONE ENCOUNTER
Pt requesting to speak with RN regarding current medication(unable to provide name of medication). Pt states he is still coughing up mucus.   Please call [phone]

## 2020-08-21 NOTE — TELEPHONE ENCOUNTER
Spoke with patient regarding message below. Patient states he has been using acapella device for a couple months and does not feel like he is getting better, coughing is still the same (not worse). Patient frustrated that cough is not better.  Informed aaliyah

## 2020-08-27 ENCOUNTER — OFFICE VISIT (OUTPATIENT)
Dept: PULMONOLOGY | Facility: CLINIC | Age: 45
End: 2020-08-27
Payer: COMMERCIAL

## 2020-08-27 VITALS
OXYGEN SATURATION: 97 % | SYSTOLIC BLOOD PRESSURE: 145 MMHG | WEIGHT: 198 LBS | RESPIRATION RATE: 20 BRPM | TEMPERATURE: 97 F | HEIGHT: 73 IN | DIASTOLIC BLOOD PRESSURE: 86 MMHG | HEART RATE: 62 BPM | BODY MASS INDEX: 26.24 KG/M2

## 2020-08-27 DIAGNOSIS — R05.9 COUGH: Primary | ICD-10-CM

## 2020-08-27 DIAGNOSIS — J47.9 BRONCHIECTASIS WITHOUT COMPLICATION (HCC): ICD-10-CM

## 2020-08-27 PROBLEM — J30.9 ALLERGIC RHINITIS: Status: ACTIVE | Noted: 2020-08-27

## 2020-08-27 PROCEDURE — 3079F DIAST BP 80-89 MM HG: CPT | Performed by: PHYSICIAN ASSISTANT

## 2020-08-27 PROCEDURE — 99214 OFFICE O/P EST MOD 30 MIN: CPT | Performed by: PHYSICIAN ASSISTANT

## 2020-08-27 PROCEDURE — 99212 OFFICE O/P EST SF 10 MIN: CPT | Performed by: PHYSICIAN ASSISTANT

## 2020-08-27 PROCEDURE — 3077F SYST BP >= 140 MM HG: CPT | Performed by: PHYSICIAN ASSISTANT

## 2020-08-27 PROCEDURE — 3008F BODY MASS INDEX DOCD: CPT | Performed by: PHYSICIAN ASSISTANT

## 2020-08-27 RX ORDER — FLUTICASONE PROPIONATE 50 MCG
1 SPRAY, SUSPENSION (ML) NASAL 2 TIMES DAILY
Qty: 1 INHALER | Refills: 2 | Status: SHIPPED | OUTPATIENT
Start: 2020-08-27 | End: 2020-09-26

## 2020-08-27 RX ORDER — ALBUTEROL SULFATE 90 UG/1
AEROSOL, METERED RESPIRATORY (INHALATION)
Qty: 1 INHALER | Refills: 2 | Status: SHIPPED | OUTPATIENT
Start: 2020-08-27 | End: 2020-12-14

## 2020-08-27 NOTE — PROGRESS NOTES
Pulmonary Progress Note    History of Present Illness:  Eloina Alvarado is a 38year old male presenting to pulmonary clinic today for chronic cough. He is a known patient to Dr. Gautam Michel.  He had CT scan of the chest in May 2020 demonstrating bronchiectasis breathing. Few rhonchi that clear with cough. GI: Abd soft, non-tender. Extremities: No clubbing or cyanosis. No LE edema. No calf tenderness. Neurologic: No gross motor deficits. Skin: Warm, dry.   Lymphatic: No cervical or supraclavicular lymphadenopa

## 2020-08-31 ENCOUNTER — TELEPHONE (OUTPATIENT)
Dept: PULMONOLOGY | Facility: CLINIC | Age: 45
End: 2020-08-31

## 2020-08-31 NOTE — TELEPHONE ENCOUNTER
Pt needs to f/u with Dr around the end of September - per Rachel Chu who he saw last Thursday - no openings

## 2020-09-01 NOTE — TELEPHONE ENCOUNTER
Dr. Wendy Barton- Patient needing follow-up appointment at the end of September, no appointments available.  Okay to front load on Monday 9/28 at 12:15PM?

## 2020-09-02 NOTE — TELEPHONE ENCOUNTER
Spoke with patient. Appointment scheduled for 9/28/20 at 12:15PM. Verified date, time, place, and where to park. Patient verbalized understanding.

## 2020-09-25 ENCOUNTER — TELEPHONE (OUTPATIENT)
Dept: PULMONOLOGY | Facility: CLINIC | Age: 45
End: 2020-09-25

## 2020-09-25 NOTE — TELEPHONE ENCOUNTER
Dr. Juliana Pitts, the primary reason I recommended 1-month f/u was due to his anxiety regarding dx of bronchiectasis. I provided reassruance and explained dx including therapies.  I would be happy to see him if acute problem but think he would ultimately benefit f

## 2020-09-25 NOTE — TELEPHONE ENCOUNTER
Pt had appt scheduled on 9/28/20 with Dr. Cornelia Enamorado and can't make it. Can he be seen sooner than first available? Pt states he was told per Norman Wilson to see Dr. Cornelia Enamorado for this appt. Please call.

## 2020-09-25 NOTE — TELEPHONE ENCOUNTER
Per last office notes (8/27/20) from Marshall Reno PA-C pt to f/u @ 1 mo interval w/ Dr. Low Joya if needed. Dr. Earl Reilly- ok to add pt to your schedule?

## 2020-09-28 NOTE — TELEPHONE ENCOUNTER
VELASQUEZ and Vanita Caceres, you can certainly add him onto my schedule to reschedule his appointment. Sounds like he may need a little bit more reassurance from me.

## 2020-10-01 ENCOUNTER — OFFICE VISIT (OUTPATIENT)
Dept: PULMONOLOGY | Facility: CLINIC | Age: 45
End: 2020-10-01
Payer: COMMERCIAL

## 2020-10-01 VITALS
HEIGHT: 73 IN | WEIGHT: 193 LBS | DIASTOLIC BLOOD PRESSURE: 93 MMHG | OXYGEN SATURATION: 97 % | RESPIRATION RATE: 18 BRPM | SYSTOLIC BLOOD PRESSURE: 131 MMHG | TEMPERATURE: 97 F | HEART RATE: 63 BPM | BODY MASS INDEX: 25.58 KG/M2

## 2020-10-01 DIAGNOSIS — J47.9 BRONCHIECTASIS WITHOUT COMPLICATION (HCC): Primary | ICD-10-CM

## 2020-10-01 PROCEDURE — 99213 OFFICE O/P EST LOW 20 MIN: CPT | Performed by: INTERNAL MEDICINE

## 2020-10-01 PROCEDURE — 3075F SYST BP GE 130 - 139MM HG: CPT | Performed by: INTERNAL MEDICINE

## 2020-10-01 PROCEDURE — 99212 OFFICE O/P EST SF 10 MIN: CPT | Performed by: INTERNAL MEDICINE

## 2020-10-01 PROCEDURE — 3080F DIAST BP >= 90 MM HG: CPT | Performed by: INTERNAL MEDICINE

## 2020-10-01 PROCEDURE — 3008F BODY MASS INDEX DOCD: CPT | Performed by: INTERNAL MEDICINE

## 2020-10-01 NOTE — PROGRESS NOTES
The patient is a 42-year-old male who comes in now for follow-up. He is very frustrated with ongoing intermittent cough. He notes that the metered-dose inhalers and the Acapella flutter valve have helped.   But she does have intermittent coughing spasms w

## 2020-10-08 ENCOUNTER — LAB ENCOUNTER (OUTPATIENT)
Dept: LAB | Age: 45
End: 2020-10-08
Attending: INTERNAL MEDICINE
Payer: COMMERCIAL

## 2020-10-08 DIAGNOSIS — J47.9 BRONCHIECTASIS WITHOUT COMPLICATION (HCC): ICD-10-CM

## 2020-10-08 PROCEDURE — 81220 CFTR GENE COM VARIANTS: CPT

## 2020-10-08 PROCEDURE — 36415 COLL VENOUS BLD VENIPUNCTURE: CPT

## 2020-10-08 PROCEDURE — 87070 CULTURE OTHR SPECIMN AEROBIC: CPT

## 2020-10-08 PROCEDURE — 82785 ASSAY OF IGE: CPT

## 2020-10-08 PROCEDURE — 87205 SMEAR GRAM STAIN: CPT

## 2020-10-08 PROCEDURE — 86003 ALLG SPEC IGE CRUDE XTRC EA: CPT

## 2020-11-23 ENCOUNTER — OFFICE VISIT (OUTPATIENT)
Dept: NEPHROLOGY | Facility: CLINIC | Age: 45
End: 2020-11-23
Payer: COMMERCIAL

## 2020-11-23 VITALS
DIASTOLIC BLOOD PRESSURE: 88 MMHG | SYSTOLIC BLOOD PRESSURE: 137 MMHG | HEART RATE: 61 BPM | WEIGHT: 197 LBS | BODY MASS INDEX: 26 KG/M2

## 2020-11-23 DIAGNOSIS — J30.9 ALLERGIC RHINITIS, UNSPECIFIED SEASONALITY, UNSPECIFIED TRIGGER: ICD-10-CM

## 2020-11-23 DIAGNOSIS — Z00.00 ROUTINE ADULT HEALTH MAINTENANCE: Primary | ICD-10-CM

## 2020-11-23 DIAGNOSIS — R05.9 COUGH: ICD-10-CM

## 2020-11-23 DIAGNOSIS — Z12.5 SCREENING FOR PROSTATE CANCER: ICD-10-CM

## 2020-11-23 DIAGNOSIS — J47.9 BRONCHIECTASIS WITHOUT COMPLICATION (HCC): ICD-10-CM

## 2020-11-23 PROCEDURE — 3075F SYST BP GE 130 - 139MM HG: CPT | Performed by: INTERNAL MEDICINE

## 2020-11-23 PROCEDURE — 99396 PREV VISIT EST AGE 40-64: CPT | Performed by: INTERNAL MEDICINE

## 2020-11-23 PROCEDURE — 3079F DIAST BP 80-89 MM HG: CPT | Performed by: INTERNAL MEDICINE

## 2020-11-23 RX ORDER — LOSARTAN POTASSIUM 50 MG/1
100 TABLET ORAL DAILY
Qty: 90 TABLET | Refills: 3 | Status: SHIPPED | OUTPATIENT
Start: 2020-11-23 | End: 2020-11-23 | Stop reason: CLARIF

## 2020-11-23 RX ORDER — AMLODIPINE BESYLATE 10 MG/1
10 TABLET ORAL EVERY EVENING
Qty: 90 TABLET | Refills: 3 | Status: SHIPPED | OUTPATIENT
Start: 2020-11-23 | End: 2021-11-22

## 2020-11-23 NOTE — PROGRESS NOTES
Progress Note     Frankie Blank    Is here for physical he has been bothered completely by bronchiectasis he had been taking Advair but is too expensive I suggested Flovent and albuterol which he is willing to try  It also like to get off of losa drainage  Eyes:  Negative for eye discharge and vision loss  Cardiovascular:  Negative for chest pain, sob  Respiratory:  Negative for cough, dyspnea and wheezing  Gastrointestinal:  Negative for abdominal pain, constipation  Genitourinary:  Negative for d Visit:  Orders Placed This Encounter      TSH - Assay, Thyroid Stim Hormone      CBC W Differential W Platelet      Comp Metabolic Panel (14)      Lipid Panel      PSA (Screening) [E]      Urinalysis, Routine      Meds This Visit:  Requested Prescriptions Runny nose      ROS:     Constitutional:  Negative for decreased activity, fever, irritability and lethargy  ENMT:  Negative for ear drainage, hearing loss and nasal drainage  Eyes:  Negative for eye discharge and vision loss  Cardiovascular:  Neg (Screening) [E]      Urinalysis, Routine      Meds This Visit:  Requested Prescriptions     Signed Prescriptions Disp Refills   • Fluticasone Propionate  MCG/ACT Inhalation Aerosol 6 Inhaler 3     Sig: Inhale 2 puffs into the lungs 2 (two) times michelle

## 2020-11-23 NOTE — PATIENT INSTRUCTIONS
Be in touch with Dr. Petty Oseguera    If you do not hear from me in about a week let me know    Please sign up for my chart    Please get your labs fasting    I hope your lungs feel better I sent Flovent and losartan to the drugstore losartan is 100 mg/day Flovent

## 2020-11-24 ENCOUNTER — TELEPHONE (OUTPATIENT)
Dept: NEPHROLOGY | Facility: CLINIC | Age: 45
End: 2020-11-24

## 2020-11-24 NOTE — TELEPHONE ENCOUNTER
Josh Vigil   happy thanksgiving week   when ever you get a chance   i'd like to go over some stuff with you on Tremaine Meli   please =give me a call  At your convenience   thanks  Tremaine Garrison

## 2020-12-04 ENCOUNTER — TELEPHONE (OUTPATIENT)
Dept: NEPHROLOGY | Facility: CLINIC | Age: 45
End: 2020-12-04

## 2020-12-05 ENCOUNTER — HOSPITAL ENCOUNTER (OUTPATIENT)
Age: 45
Discharge: HOME | End: 2020-12-05
Payer: COMMERCIAL

## 2020-12-05 VITALS
DIASTOLIC BLOOD PRESSURE: 95 MMHG | OXYGEN SATURATION: 100 % | WEIGHT: 190 LBS | RESPIRATION RATE: 18 BRPM | HEART RATE: 113 BPM | BODY MASS INDEX: 25.18 KG/M2 | SYSTOLIC BLOOD PRESSURE: 134 MMHG | HEIGHT: 73 IN | TEMPERATURE: 99 F

## 2020-12-05 DIAGNOSIS — R36.9 PENILE DISCHARGE: Primary | ICD-10-CM

## 2020-12-05 DIAGNOSIS — R05.9 COUGH: ICD-10-CM

## 2020-12-05 PROCEDURE — 99213 OFFICE O/P EST LOW 20 MIN: CPT | Performed by: NURSE PRACTITIONER

## 2020-12-05 PROCEDURE — 81002 URINALYSIS NONAUTO W/O SCOPE: CPT | Performed by: NURSE PRACTITIONER

## 2020-12-05 PROCEDURE — 96372 THER/PROPH/DIAG INJ SC/IM: CPT | Performed by: NURSE PRACTITIONER

## 2020-12-05 RX ORDER — AZITHROMYCIN 250 MG/1
1000 TABLET, FILM COATED ORAL ONCE
Status: COMPLETED | OUTPATIENT
Start: 2020-12-05 | End: 2020-12-05

## 2020-12-05 RX ORDER — METHYLPREDNISOLONE 4 MG/1
TABLET ORAL
Qty: 1 PACKAGE | Refills: 0 | Status: SHIPPED | OUTPATIENT
Start: 2020-12-05 | End: 2021-11-22

## 2020-12-05 NOTE — ED PROVIDER NOTES
Patient Seen in: [facility]      History   Patient presents with:  Zheng    Stated Complaint: genital area discharge     HPI    This is a well-appearing 60-year-old male who presents with a chief complaint of penile discharge and cough.   Pa Temporal   SpO2 100 %   O2 Device None (Room air)       Current:BP (!) 134/95   Pulse 113   Temp 99.3 °F (37.4 °C) (Temporal)   Resp 18   Ht 185.4 cm (6' 1\")   Wt 86.2 kg   SpO2 100%   BMI 25.07 kg/m²         Physical Exam  Vitals signs and nursing note r colored urine. GC chlamydia was sent. Him and I did discuss treatment for sexually transmitted diseases. I will give Rocephin and azithromycin. He is wheezing. He does have an inhaler at home.   He was placed on 2 other inhalers one being Advair but st

## 2020-12-05 NOTE — ED INITIAL ASSESSMENT (HPI)
Pt states having white discharge from penis that he noticed on Thursday. Pt states no pain in area. Pt states recently having unprotected sex and is concerned for an std.

## 2020-12-07 ENCOUNTER — LAB ENCOUNTER (OUTPATIENT)
Dept: LAB | Age: 45
End: 2020-12-07
Attending: INTERNAL MEDICINE
Payer: COMMERCIAL

## 2020-12-07 DIAGNOSIS — Z12.5 SCREENING FOR PROSTATE CANCER: ICD-10-CM

## 2020-12-07 DIAGNOSIS — Z00.00 ROUTINE ADULT HEALTH MAINTENANCE: ICD-10-CM

## 2020-12-07 PROCEDURE — 84443 ASSAY THYROID STIM HORMONE: CPT | Performed by: INTERNAL MEDICINE

## 2020-12-07 PROCEDURE — 80061 LIPID PANEL: CPT | Performed by: INTERNAL MEDICINE

## 2020-12-07 PROCEDURE — 36415 COLL VENOUS BLD VENIPUNCTURE: CPT | Performed by: INTERNAL MEDICINE

## 2020-12-07 PROCEDURE — 81003 URINALYSIS AUTO W/O SCOPE: CPT | Performed by: INTERNAL MEDICINE

## 2020-12-07 PROCEDURE — 85025 COMPLETE CBC W/AUTO DIFF WBC: CPT | Performed by: INTERNAL MEDICINE

## 2020-12-07 PROCEDURE — 80053 COMPREHEN METABOLIC PANEL: CPT | Performed by: INTERNAL MEDICINE

## 2020-12-11 NOTE — TELEPHONE ENCOUNTER
Patient requesting 90 days refills Fluticasone and Albuterol. Please call. Thank you.     Current Outpatient Medications   Medication Sig Dispense Refill   • Fluticasone Propionate  MCG/ACT Inhalation Aerosol Inhale 2 puffs into the lungs 2 (two) t

## 2020-12-14 ENCOUNTER — TELEPHONE (OUTPATIENT)
Dept: NEPHROLOGY | Facility: CLINIC | Age: 45
End: 2020-12-14

## 2020-12-14 DIAGNOSIS — R74.01 TRANSAMINITIS: Primary | ICD-10-CM

## 2020-12-14 RX ORDER — ALBUTEROL SULFATE 90 UG/1
AEROSOL, METERED RESPIRATORY (INHALATION)
Qty: 3 INHALER | Refills: 1 | Status: SHIPPED | OUTPATIENT
Start: 2020-12-14 | End: 2021-07-23

## 2020-12-14 RX ORDER — FLUTICASONE PROPIONATE 50 MCG
1 SPRAY, SUSPENSION (ML) NASAL 2 TIMES DAILY
Qty: 3 INHALER | Refills: 1 | Status: SHIPPED | OUTPATIENT
Start: 2020-12-14 | End: 2020-12-23

## 2020-12-14 NOTE — TELEPHONE ENCOUNTER
LOV 10/1/20  Fluticasone Propionate HFA last filled by PCP 11/23/20 x 6 inhaler w/ 3 refills. Albuterol sulfate HFA last filled 8/27/20 x 1 inhaler w/ 2 refills. Pt requests fluticasone propionate 50 mcg nasal spray & albuterol sulfate HFA x 90 days.  P

## 2020-12-15 ENCOUNTER — PATIENT MESSAGE (OUTPATIENT)
Dept: NEPHROLOGY | Facility: CLINIC | Age: 45
End: 2020-12-15

## 2020-12-15 ENCOUNTER — TELEPHONE (OUTPATIENT)
Dept: PULMONOLOGY | Facility: CLINIC | Age: 45
End: 2020-12-15

## 2020-12-15 RX ORDER — SIMVASTATIN 20 MG
20 TABLET ORAL NIGHTLY
Qty: 90 TABLET | Refills: 3 | Status: SHIPPED | OUTPATIENT
Start: 2020-12-15 | End: 2021-12-27

## 2020-12-15 RX ORDER — MONTELUKAST SODIUM 10 MG/1
10 TABLET ORAL NIGHTLY
Qty: 90 TABLET | Refills: 3 | Status: SHIPPED | OUTPATIENT
Start: 2020-12-15 | End: 2021-12-27

## 2020-12-15 NOTE — TELEPHONE ENCOUNTER
Spoke to patient he needs an ultrasound of his liver because of elevated LFTs recently getting over gonorrhea and chlamydia told him not to have any further sex with his partner unless she is treated and he understands this.  We'll check a hepatitis series

## 2020-12-15 NOTE — TELEPHONE ENCOUNTER
Current Outpatient Medications   Medication Sig Dispense Refill   • Albuterol Sulfate  (90 Base) MCG/ACT Inhalation Aero Soln Inhale 2 puffs by inhalation route every 4-6 hours as needed for shortness of breath or wheezing.  3 Inhaler 1     Per pharm

## 2020-12-15 NOTE — TELEPHONE ENCOUNTER
From: Maria De Jesus Ascencio  To: Macario Chang MD  Sent: 12/15/2020 8:27 AM CST  Subject: Prescription Question    Hello,  I need new 90 day prescriptions sent to [facility] for Simvastatin and Montelukast. There are no more refills remaining.     Sulema Barthel

## 2020-12-15 NOTE — TELEPHONE ENCOUNTER
Patient contacted. Verified that he is going to [place] for lab work. [facility] removed from [facility] and labs re ordered. Patient is going to get them done on Wednesday.

## 2020-12-16 ENCOUNTER — LAB ENCOUNTER (OUTPATIENT)
Dept: LAB | Age: 45
End: 2020-12-16
Attending: INTERNAL MEDICINE
Payer: COMMERCIAL

## 2020-12-16 DIAGNOSIS — R74.01 TRANSAMINITIS: ICD-10-CM

## 2020-12-16 PROCEDURE — 83540 ASSAY OF IRON: CPT

## 2020-12-16 PROCEDURE — 36415 COLL VENOUS BLD VENIPUNCTURE: CPT | Performed by: INTERNAL MEDICINE

## 2020-12-16 PROCEDURE — 84466 ASSAY OF TRANSFERRIN: CPT

## 2020-12-16 PROCEDURE — 80074 ACUTE HEPATITIS PANEL: CPT | Performed by: INTERNAL MEDICINE

## 2020-12-16 PROCEDURE — 82728 ASSAY OF FERRITIN: CPT

## 2020-12-16 PROCEDURE — 86706 HEP B SURFACE ANTIBODY: CPT

## 2020-12-16 RX ORDER — MONTELUKAST SODIUM 10 MG/1
TABLET ORAL
Qty: 90 TABLET | Refills: 3 | OUTPATIENT
Start: 2020-12-16

## 2020-12-16 RX ORDER — SIMVASTATIN 20 MG
TABLET ORAL
Qty: 90 TABLET | Refills: 3 | OUTPATIENT
Start: 2020-12-16

## 2020-12-23 RX ORDER — FLUTICASONE PROPIONATE 50 MCG
SPRAY, SUSPENSION (ML) NASAL
Qty: 16 G | Refills: 2 | Status: SHIPPED | OUTPATIENT
Start: 2020-12-23 | End: 2021-10-18

## 2021-01-07 ENCOUNTER — TELEPHONE (OUTPATIENT)
Dept: NEPHROLOGY | Facility: CLINIC | Age: 46
End: 2021-01-07

## 2021-01-07 ENCOUNTER — HOSPITAL ENCOUNTER (OUTPATIENT)
Dept: ULTRASOUND IMAGING | Facility: HOSPITAL | Age: 46
Discharge: HOME | End: 2021-01-07
Attending: INTERNAL MEDICINE
Payer: COMMERCIAL

## 2021-01-07 DIAGNOSIS — R74.01 TRANSAMINITIS: ICD-10-CM

## 2021-01-07 DIAGNOSIS — R74.01 TRANSAMINITIS: Primary | ICD-10-CM

## 2021-01-07 PROCEDURE — 76705 ECHO EXAM OF ABDOMEN: CPT | Performed by: INTERNAL MEDICINE

## 2021-02-01 ENCOUNTER — LAB ENCOUNTER (OUTPATIENT)
Dept: LAB | Facility: REFERENCE LAB | Age: 46
End: 2021-02-01
Attending: INTERNAL MEDICINE
Payer: COMMERCIAL

## 2021-02-01 DIAGNOSIS — R74.01 TRANSAMINITIS: ICD-10-CM

## 2021-02-01 LAB
ALBUMIN SERPL-MCNC: 3.8 G/DL (ref 3.4–5)
ALP LIVER SERPL-CCNC: 98 U/L
ALT SERPL-CCNC: 50 U/L
AST SERPL-CCNC: 22 U/L (ref 15–37)
BILIRUB DIRECT SERPL-MCNC: 0.2 MG/DL (ref 0–0.2)
BILIRUB SERPL-MCNC: 0.6 MG/DL (ref 0.1–2)
M PROTEIN MFR SERPL ELPH: 7.8 G/DL (ref 6.4–8.2)

## 2021-02-01 PROCEDURE — 80076 HEPATIC FUNCTION PANEL: CPT

## 2021-02-01 PROCEDURE — 36415 COLL VENOUS BLD VENIPUNCTURE: CPT

## 2021-04-05 ENCOUNTER — TELEPHONE (OUTPATIENT)
Dept: PULMONOLOGY | Facility: CLINIC | Age: 46
End: 2021-04-05

## 2021-04-30 ENCOUNTER — TELEPHONE (OUTPATIENT)
Dept: PULMONOLOGY | Facility: CLINIC | Age: 46
End: 2021-04-30

## 2021-04-30 NOTE — TELEPHONE ENCOUNTER
[facility] requesting RN to obtain prior auth for CT Scan with contrast.  Please call patient at [phone]. For additional questions please call central scheduling. Thank you.

## 2021-05-03 NOTE — TELEPHONE ENCOUNTER
Prior Authorization (CT Scan of chest with contrast)    Leslie Doe 1 hour ago (9:20 AM)   AK  Good Morning Musa Diallo,       Referral is approved no auth required,I left a message from original request back on 10/2020     Thanks     [address] [facility]

## 2021-05-03 NOTE — TELEPHONE ENCOUNTER
Managed Care-Can you please confirm CT was authorized (Referral [id number]). Patient was calling to confirm.

## 2021-05-08 ENCOUNTER — HOSPITAL ENCOUNTER (OUTPATIENT)
Dept: CT IMAGING | Facility: HOSPITAL | Age: 46
Discharge: HOME | End: 2021-05-08
Attending: INTERNAL MEDICINE
Payer: COMMERCIAL

## 2021-05-08 DIAGNOSIS — J47.9 BRONCHIECTASIS WITHOUT COMPLICATION (HCC): ICD-10-CM

## 2021-05-08 PROCEDURE — 82565 ASSAY OF CREATININE: CPT

## 2021-05-08 PROCEDURE — 71260 CT THORAX DX C+: CPT | Performed by: INTERNAL MEDICINE

## 2021-05-09 ENCOUNTER — TELEPHONE (OUTPATIENT)
Dept: PULMONOLOGY | Facility: CLINIC | Age: 46
End: 2021-05-09

## 2021-05-09 DIAGNOSIS — J47.9 BRONCHIECTASIS WITHOUT COMPLICATION (HCC): Primary | ICD-10-CM

## 2021-05-09 RX ORDER — LEVOFLOXACIN 500 MG/1
500 TABLET, FILM COATED ORAL DAILY
Qty: 10 TABLET | Refills: 0 | Status: SHIPPED | OUTPATIENT
Start: 2021-05-09 | End: 2021-11-22

## 2021-05-10 NOTE — TELEPHONE ENCOUNTER
RN, I spoke with the patient regarding the results of his CT scan of the chest.  I sent in a longer course of antibiotic.   Please add to the calendar a repeat CT scan of the chest at the 4-month interval.

## 2021-05-25 RX ORDER — LISINOPRIL AND HYDROCHLOROTHIAZIDE 20; 12.5 MG/1; MG/1
TABLET ORAL
Qty: 90 TABLET | Refills: 0 | Status: SHIPPED | OUTPATIENT
Start: 2021-05-25 | End: 2021-09-09

## 2021-05-25 NOTE — TELEPHONE ENCOUNTER
Spoke with patient, pt states he cannot take amlodipine anymore because its causing feet to swell. Requesting to go back on lisinopril-hydrochlorothiazide.  Rx pended if appropriate

## 2021-06-09 ENCOUNTER — TELEPHONE (OUTPATIENT)
Dept: PULMONOLOGY | Facility: CLINIC | Age: 46
End: 2021-06-09

## 2021-06-09 DIAGNOSIS — R06.00 DYSPNEA, UNSPECIFIED TYPE: Primary | ICD-10-CM

## 2021-06-11 RX ORDER — LEVOFLOXACIN 750 MG/1
750 TABLET ORAL DAILY
Qty: 7 TABLET | Refills: 0 | Status: SHIPPED | OUTPATIENT
Start: 2021-06-11 | End: 2021-11-22

## 2021-06-11 NOTE — TELEPHONE ENCOUNTER
I have prescribed course of antibiotic therapy for the patient. I have also ordered a chest x-ray for the patient.

## 2021-06-11 NOTE — TELEPHONE ENCOUNTER
Spoke with patient states he is still congested, coughing constantly with mostly yellow sputum, sometimes brown sputum, shortness of breath with coughing fits. Yesterday had blood-tinged sputum after coughing fit. Using inhaler and Acapella.   Denies chest

## 2021-06-18 NOTE — TELEPHONE ENCOUNTER
Did the patient take the Levaquin prescribed last week? I do see he did not complete the chest x-ray.

## 2021-06-18 NOTE — TELEPHONE ENCOUNTER
Spoke with patient confirmed that he is taking Levaquin, today is the last day, states he is not coughing up mucous like before, but still is coughing all of the time, using flutter valve, nose spray and albuterol.   Informed him of chest xray order placed

## 2021-07-16 ENCOUNTER — TELEPHONE (OUTPATIENT)
Dept: PULMONOLOGY | Facility: CLINIC | Age: 46
End: 2021-07-16

## 2021-07-23 RX ORDER — ALBUTEROL SULFATE 90 UG/1
AEROSOL, METERED RESPIRATORY (INHALATION)
Qty: 25.5 G | Refills: 3 | Status: SHIPPED | OUTPATIENT
Start: 2021-07-23

## 2021-07-23 NOTE — TELEPHONE ENCOUNTER
Last office visit: 10/1/2020  Last refill: 12/14/2020    Dr. Bret Spurling - Please review/sign pended refill

## 2021-08-18 ENCOUNTER — TELEPHONE (OUTPATIENT)
Dept: PULMONOLOGY | Facility: CLINIC | Age: 46
End: 2021-08-18

## 2021-09-09 RX ORDER — LISINOPRIL AND HYDROCHLOROTHIAZIDE 20; 12.5 MG/1; MG/1
TABLET ORAL
Qty: 90 TABLET | Refills: 0 | Status: SHIPPED | OUTPATIENT
Start: 2021-09-09 | End: 2021-11-22

## 2021-10-18 RX ORDER — FLUTICASONE PROPIONATE 50 MCG
SPRAY, SUSPENSION (ML) NASAL
Qty: 3 EACH | Refills: 0 | Status: SHIPPED | OUTPATIENT
Start: 2021-10-18 | End: 2021-12-30

## 2021-10-18 NOTE — TELEPHONE ENCOUNTER
Last office visit 10/1/20  Last refill 12/23/20    Dr. Viviana Cruz- Please review/sign pended refill request.

## 2021-10-26 ENCOUNTER — HOSPITAL ENCOUNTER (OUTPATIENT)
Dept: CT IMAGING | Facility: HOSPITAL | Age: 46
Discharge: HOME | End: 2021-10-26
Attending: INTERNAL MEDICINE
Payer: COMMERCIAL

## 2021-10-26 ENCOUNTER — TELEPHONE (OUTPATIENT)
Dept: PULMONOLOGY | Facility: CLINIC | Age: 46
End: 2021-10-26

## 2021-10-26 DIAGNOSIS — J47.9 BRONCHIECTASIS WITHOUT COMPLICATION (HCC): Primary | ICD-10-CM

## 2021-10-26 DIAGNOSIS — J47.9 BRONCHIECTASIS WITHOUT COMPLICATION (HCC): ICD-10-CM

## 2021-10-26 PROCEDURE — 71260 CT THORAX DX C+: CPT | Performed by: INTERNAL MEDICINE

## 2021-10-26 PROCEDURE — 82565 ASSAY OF CREATININE: CPT

## 2021-10-27 NOTE — TELEPHONE ENCOUNTER
RN, I spoke with the patient regarding the results of his CT scan of the chest.  I sent off immunoglobulin levels.   Please add to the calendar a repeat CT scan of the chest at the 6-month interval.

## 2021-11-11 ENCOUNTER — TELEPHONE (OUTPATIENT)
Dept: PULMONOLOGY | Facility: CLINIC | Age: 46
End: 2021-11-11

## 2021-11-11 DIAGNOSIS — Z01.812 ENCOUNTER FOR PREPROCEDURE SCREENING LABORATORY TESTING FOR COVID-19: ICD-10-CM

## 2021-11-11 DIAGNOSIS — R06.02 SOB (SHORTNESS OF BREATH): Primary | ICD-10-CM

## 2021-11-11 DIAGNOSIS — Z20.822 ENCOUNTER FOR PREPROCEDURE SCREENING LABORATORY TESTING FOR COVID-19: ICD-10-CM

## 2021-11-11 NOTE — TELEPHONE ENCOUNTER
RN, I have discussed the results of the CAT scan with the patient to couple weeks ago. He was supposed to get immunoglobulin levels. You can forward the results to this infusion center because it seems like they are the ones that are looking for the scan.

## 2021-11-11 NOTE — TELEPHONE ENCOUNTER
Loyda Harman @ [facility] was informed to contact Medical Records @ #[phone] to obtain desired report. She voiced understanding.

## 2021-11-11 NOTE — TELEPHONE ENCOUNTER
Dakota Samson from Dr. Grimes Telugu office called in to get pt test results showing infection of the lungs.  Please follow up fax # [phone]

## 2021-11-12 ENCOUNTER — LAB ENCOUNTER (OUTPATIENT)
Dept: LAB | Age: 46
End: 2021-11-12
Attending: INTERNAL MEDICINE
Payer: COMMERCIAL

## 2021-11-12 DIAGNOSIS — J47.9 BRONCHIECTASIS WITHOUT COMPLICATION (HCC): ICD-10-CM

## 2021-11-12 PROCEDURE — 36415 COLL VENOUS BLD VENIPUNCTURE: CPT

## 2021-11-12 PROCEDURE — 82784 ASSAY IGA/IGD/IGG/IGM EACH: CPT

## 2021-11-15 NOTE — TELEPHONE ENCOUNTER
Instructed pt to schedule treadmill stress test through Central Scheduling & to discuss testing with them as he is concerned he will have to wear a mask during test. Their phone # was given.  Explained CT chest not due until 4/2022, will ask MD to review im

## 2021-11-16 NOTE — TELEPHONE ENCOUNTER
Pt notified of Dr. Rosalind Kirkpatrick orders below. He had no further concerns/questions at this time.

## 2021-11-22 ENCOUNTER — OFFICE VISIT (OUTPATIENT)
Dept: INTERNAL MEDICINE CLINIC | Facility: CLINIC | Age: 46
End: 2021-11-22
Payer: COMMERCIAL

## 2021-11-22 VITALS
BODY MASS INDEX: 25.84 KG/M2 | WEIGHT: 195 LBS | TEMPERATURE: 98 F | HEIGHT: 73 IN | HEART RATE: 72 BPM | DIASTOLIC BLOOD PRESSURE: 78 MMHG | SYSTOLIC BLOOD PRESSURE: 110 MMHG

## 2021-11-22 DIAGNOSIS — I10 ESSENTIAL HYPERTENSION, BENIGN: ICD-10-CM

## 2021-11-22 DIAGNOSIS — E78.00 PURE HYPERCHOLESTEROLEMIA: ICD-10-CM

## 2021-11-22 DIAGNOSIS — Z12.11 COLON CANCER SCREENING: ICD-10-CM

## 2021-11-22 DIAGNOSIS — J47.9 BRONCHIECTASIS WITHOUT COMPLICATION (HCC): ICD-10-CM

## 2021-11-22 DIAGNOSIS — Z00.00 ANNUAL PHYSICAL EXAM: Primary | ICD-10-CM

## 2021-11-22 PROCEDURE — 90686 IIV4 VACC NO PRSV 0.5 ML IM: CPT | Performed by: INTERNAL MEDICINE

## 2021-11-22 PROCEDURE — 3008F BODY MASS INDEX DOCD: CPT | Performed by: INTERNAL MEDICINE

## 2021-11-22 PROCEDURE — 3074F SYST BP LT 130 MM HG: CPT | Performed by: INTERNAL MEDICINE

## 2021-11-22 PROCEDURE — 90471 IMMUNIZATION ADMIN: CPT | Performed by: INTERNAL MEDICINE

## 2021-11-22 PROCEDURE — 3078F DIAST BP <80 MM HG: CPT | Performed by: INTERNAL MEDICINE

## 2021-11-22 PROCEDURE — 99396 PREV VISIT EST AGE 40-64: CPT | Performed by: INTERNAL MEDICINE

## 2021-11-22 RX ORDER — LISINOPRIL AND HYDROCHLOROTHIAZIDE 20; 12.5 MG/1; MG/1
1 TABLET ORAL DAILY
Qty: 90 TABLET | Refills: 3 | Status: SHIPPED | OUTPATIENT
Start: 2021-11-22 | End: 2021-12-27

## 2021-11-22 NOTE — PROGRESS NOTES
HPI:    Patient ID: Raul Mcknight is a 54year old male. Hypertension  This is a chronic problem. The current episode started more than 1 year ago. The problem is unchanged.  Pertinent negatives include no anxiety, blurred vision, chest pain, heada SPRAY NASALLY TWICE A DAY 3 each 0   • Albuterol Sulfate  (90 Base) MCG/ACT Inhalation Aero Soln USE 2 INHALATIONS EVERY 4 TO 6 HOURS AS NEEDED FOR SHORTNESS OF BREATH OR WHEEZING 25.5 g 3   • simvastatin 20 MG Oral Tab Take 1 tablet (20 mg total) b circumcised. No phimosis, paraphimosis, hypospadias, erythema, tenderness, discharge, swelling or lesions. Testes: Normal. Cremasteric reflex is present. Right: Mass, tenderness, swelling, testicular hydrocele or varicocele not present.  Right PSA, TOTAL [5363] [Q]      Flulaval 6 months and older 0.5 ml PFS [71099]      Urine Culture, Routine      Meds This Visit:  Requested Prescriptions     Signed Prescriptions Disp Refills   • lisinopril-hydroCHLOROthiazide 20-12.5 MG Oral Tab 90 tablet 3

## 2021-11-30 ENCOUNTER — LAB ENCOUNTER (OUTPATIENT)
Dept: LAB | Age: 46
End: 2021-11-30
Attending: INTERNAL MEDICINE
Payer: COMMERCIAL

## 2021-11-30 DIAGNOSIS — Z20.822 ENCOUNTER FOR PREPROCEDURE SCREENING LABORATORY TESTING FOR COVID-19: ICD-10-CM

## 2021-11-30 DIAGNOSIS — Z01.812 ENCOUNTER FOR PREPROCEDURE SCREENING LABORATORY TESTING FOR COVID-19: ICD-10-CM

## 2021-12-06 ENCOUNTER — TELEPHONE (OUTPATIENT)
Dept: INTERNAL MEDICINE CLINIC | Facility: CLINIC | Age: 46
End: 2021-12-06

## 2021-12-06 ENCOUNTER — TELEMEDICINE (OUTPATIENT)
Dept: INTERNAL MEDICINE CLINIC | Facility: CLINIC | Age: 46
End: 2021-12-06

## 2021-12-06 DIAGNOSIS — R05.8 COUGH PRODUCTIVE OF YELLOW SPUTUM: ICD-10-CM

## 2021-12-06 DIAGNOSIS — J47.9 BRONCHIECTASIS WITHOUT COMPLICATION (HCC): ICD-10-CM

## 2021-12-06 DIAGNOSIS — R05.9 COUGH: Primary | ICD-10-CM

## 2021-12-06 PROCEDURE — 99214 OFFICE O/P EST MOD 30 MIN: CPT | Performed by: INTERNAL MEDICINE

## 2021-12-06 RX ORDER — AMOXICILLIN AND CLAVULANATE POTASSIUM 875; 125 MG/1; MG/1
1 TABLET, FILM COATED ORAL 2 TIMES DAILY
Qty: 20 TABLET | Refills: 0 | Status: SHIPPED | OUTPATIENT
Start: 2021-12-06 | End: 2021-12-16

## 2021-12-06 NOTE — TELEPHONE ENCOUNTER
I am covering for Dr. Mili Jolley. I do not prescribe antibiotics over the phone without a video visit. He can schedule a video visit with any provider in our group or I could do it on Wednesday morning.   Alternatively, he can call Dr. Sheridan Zamorano office and le

## 2021-12-06 NOTE — TELEPHONE ENCOUNTER
Patient calling, identified name and , has productive cough  with yellow sputum X 3-4 days    Denies fever, no SOB,   COVID negative on     Declines video visit at this time      ( Also seen by Pulmonary for Brachystasis for about one year.  Produce

## 2021-12-06 NOTE — TELEPHONE ENCOUNTER
Spoke with patient, (  verified ) informed of 's   instructions below    Has video visit for today     Future Appointments   Date Time Provider Aimee Nicholson   2021  3:45 PM Sonal Reddy MD [facility]   2021 11:30 A

## 2021-12-16 ENCOUNTER — TELEPHONE (OUTPATIENT)
Dept: INTERNAL MEDICINE CLINIC | Facility: CLINIC | Age: 46
End: 2021-12-16

## 2021-12-16 RX ORDER — LEVOFLOXACIN 500 MG/1
500 TABLET, FILM COATED ORAL DAILY
Qty: 10 TABLET | Refills: 0 | Status: SHIPPED | OUTPATIENT
Start: 2021-12-16 | End: 2021-12-26

## 2021-12-16 NOTE — TELEPHONE ENCOUNTER
Hi -  LOV 12-6-2021, prescribed antibiotic (amoxicillin clavulanate) for lung infection. Taking as prescribed. Today is last day of antibiotic.       States today, he has white spots on his tonsils which are reddened and swollen, feels more run

## 2021-12-17 ENCOUNTER — NURSE ONLY (OUTPATIENT)
Dept: GASTROENTEROLOGY | Facility: CLINIC | Age: 46
End: 2021-12-17

## 2021-12-17 DIAGNOSIS — Z12.11 COLON CANCER SCREENING: Primary | ICD-10-CM

## 2021-12-17 NOTE — PROGRESS NOTES
Sweta Xie patient for his scheduled telephone colon screening.      PCP visit on 11/22/2021 and referred to GI for his first colonoscopy     Anticoagulants: no  Diabetic Meds:  No   BP meds(Ace inhibitors/ARB's): lisinopril   Weight loss meds (ph

## 2021-12-21 RX ORDER — POLYETHYLENE GLYCOL 3350, SODIUM CHLORIDE, SODIUM BICARBONATE, POTASSIUM CHLORIDE 420; 11.2; 5.72; 1.48 G/4L; G/4L; G/4L; G/4L
POWDER, FOR SOLUTION ORAL
Qty: 4000 ML | Refills: 0 | Status: SHIPPED | OUTPATIENT
Start: 2021-12-21

## 2021-12-21 NOTE — PROGRESS NOTES
Scheduling:  cln w/ iv or mac w/ Dr. Kourtney Staples or Dr. Briseida Macdonald  Dx: crc screening  trilyte split dose sent e-scribe  Hold lisinopril day of procedure if w/ MAC

## 2021-12-21 NOTE — PROGRESS NOTES
Scheduled for:  Colonoscopy 42639    Provider Name:  Dr. Ghazala Walton  Date:  1/11/2022  Location:  [facility]  Sedation:  MAC  Time:  3:00 pm Patient is aware he will receive a call the day before with his arrival time.    Prep:  Split dose trilyte  Meds/Allergies Mejia

## 2021-12-27 RX ORDER — SIMVASTATIN 20 MG
TABLET ORAL
Qty: 90 TABLET | Refills: 0 | Status: SHIPPED | OUTPATIENT
Start: 2021-12-27

## 2021-12-27 RX ORDER — MONTELUKAST SODIUM 10 MG/1
TABLET ORAL
Qty: 90 TABLET | Refills: 0 | Status: SHIPPED | OUTPATIENT
Start: 2021-12-27

## 2021-12-27 NOTE — TELEPHONE ENCOUNTER
Karsten Fitting needs a refill of:    • lisinopril-hydroCHLOROthiazide 20-12.5 MG Oral Tab Take 1 tablet by mouth daily.  90 tablet 3     **Mail order, 3 month supply**

## 2021-12-28 ENCOUNTER — HOSPITAL ENCOUNTER (OUTPATIENT)
Dept: GENERAL RADIOLOGY | Age: 46
Discharge: HOME | End: 2021-12-28
Attending: INTERNAL MEDICINE
Payer: COMMERCIAL

## 2021-12-28 ENCOUNTER — TELEPHONE (OUTPATIENT)
Dept: PULMONOLOGY | Facility: CLINIC | Age: 46
End: 2021-12-28

## 2021-12-28 DIAGNOSIS — R05.9 COUGH: Primary | ICD-10-CM

## 2021-12-28 DIAGNOSIS — R05.9 COUGH: ICD-10-CM

## 2021-12-28 PROCEDURE — 71046 X-RAY EXAM CHEST 2 VIEWS: CPT | Performed by: INTERNAL MEDICINE

## 2021-12-28 RX ORDER — LISINOPRIL AND HYDROCHLOROTHIAZIDE 20; 12.5 MG/1; MG/1
1 TABLET ORAL DAILY
Qty: 90 TABLET | Refills: 0 | Status: SHIPPED | OUTPATIENT
Start: 2021-12-28

## 2021-12-28 NOTE — TELEPHONE ENCOUNTER
I have ordered PA lateral chest x-ray to assess for pneumonia.   I would hold off on further antibiotic therapy for now until chest x-ray

## 2021-12-28 NOTE — TELEPHONE ENCOUNTER
Patient was notified with understanding. He has xray scheduled today at 4:00. Will await results and recommendations.

## 2021-12-28 NOTE — TELEPHONE ENCOUNTER
Patient contacts clinic reporting cough with productive, brown mucous. Recently finished a course of levofloxacin prescribed by Dr. Sherice Durant. Hx bronchiectasis and empyema for which he has been hospitalized. Has recurrent chest infections.   Denies chest

## 2021-12-28 NOTE — TELEPHONE ENCOUNTER
Hypertensive Medication: Refill passed per [facility], [facility] protocol.   Protocol Criteria:  · Appointment scheduled in the past 6 months or in the next 3 months  · BMP or CMP in the past 12 months  · Creatinine result < 2  Recent Outpatient Visits

## 2021-12-29 NOTE — TELEPHONE ENCOUNTER
I spoke with the patient regarding the results of his chest x-ray. I encouraged him to use the Acapella flutter valve 4 times daily. Will not give further antibiotics at this point.

## 2021-12-30 ENCOUNTER — TELEPHONE (OUTPATIENT)
Dept: PULMONOLOGY | Facility: CLINIC | Age: 46
End: 2021-12-30

## 2021-12-30 RX ORDER — FLUTICASONE PROPIONATE 50 MCG
SPRAY, SUSPENSION (ML) NASAL
Qty: 48 G | Refills: 3 | Status: SHIPPED | OUTPATIENT
Start: 2021-12-30

## 2021-12-30 NOTE — TELEPHONE ENCOUNTER
Last office visit: 10/1/20   Last refill: 10/18/21    Left message for patient, needs to schedule an appointment for any additional refills.

## 2022-01-03 ENCOUNTER — LAB ENCOUNTER (OUTPATIENT)
Dept: LAB | Age: 47
End: 2022-01-03
Attending: INTERNAL MEDICINE
Payer: COMMERCIAL

## 2022-01-03 DIAGNOSIS — Z00.00 ROUTINE GENERAL MEDICAL EXAMINATION AT A HEALTH CARE FACILITY: Primary | ICD-10-CM

## 2022-01-03 LAB
ALBUMIN SERPL-MCNC: 3.9 G/DL (ref 3.4–5)
ALBUMIN/GLOB SERPL: 1 {RATIO} (ref 1–2)
ALP LIVER SERPL-CCNC: 117 U/L
ALT SERPL-CCNC: 31 U/L
ANION GAP SERPL CALC-SCNC: 9 MMOL/L (ref 0–18)
AST SERPL-CCNC: 16 U/L (ref 15–37)
BASOPHILS # BLD AUTO: 0.07 X10(3) UL (ref 0–0.2)
BASOPHILS NFR BLD AUTO: 0.9 %
BILIRUB SERPL-MCNC: 0.6 MG/DL (ref 0.1–2)
BUN BLD-MCNC: 13 MG/DL (ref 7–18)
BUN/CREAT SERPL: 17.6 (ref 10–20)
CALCIUM BLD-MCNC: 8.9 MG/DL (ref 8.5–10.1)
CHLORIDE SERPL-SCNC: 105 MMOL/L (ref 98–112)
CHOLEST SERPL-MCNC: 187 MG/DL (ref ?–200)
CO2 SERPL-SCNC: 25 MMOL/L (ref 21–32)
CREAT BLD-MCNC: 0.74 MG/DL
DEPRECATED RDW RBC AUTO: 43.3 FL (ref 35.1–46.3)
EOSINOPHIL # BLD AUTO: 0.22 X10(3) UL (ref 0–0.7)
EOSINOPHIL NFR BLD AUTO: 2.9 %
ERYTHROCYTE [DISTWIDTH] IN BLOOD BY AUTOMATED COUNT: 13.4 % (ref 11–15)
EST. AVERAGE GLUCOSE BLD GHB EST-MCNC: 103 MG/DL (ref 68–126)
FASTING PATIENT LIPID ANSWER: YES
FASTING STATUS PATIENT QL REPORTED: YES
GLOBULIN PLAS-MCNC: 4 G/DL (ref 2.8–4.4)
GLUCOSE BLD-MCNC: 93 MG/DL (ref 70–99)
HBA1C MFR BLD: 5.2 % (ref ?–5.7)
HCT VFR BLD AUTO: 47.4 %
HDLC SERPL-MCNC: 47 MG/DL (ref 40–59)
HGB BLD-MCNC: 15.6 G/DL
IMM GRANULOCYTES # BLD AUTO: 0.02 X10(3) UL (ref 0–1)
IMM GRANULOCYTES NFR BLD: 0.3 %
LDLC SERPL CALC-MCNC: 105 MG/DL (ref ?–100)
LYMPHOCYTES # BLD AUTO: 2.06 X10(3) UL (ref 1–4)
LYMPHOCYTES NFR BLD AUTO: 27.3 %
MCH RBC QN AUTO: 29 PG (ref 26–34)
MCHC RBC AUTO-ENTMCNC: 32.9 G/DL (ref 31–37)
MCV RBC AUTO: 88.1 FL
MONOCYTES # BLD AUTO: 0.92 X10(3) UL (ref 0.1–1)
MONOCYTES NFR BLD AUTO: 12.2 %
NEUTROPHILS # BLD AUTO: 4.26 X10 (3) UL (ref 1.5–7.7)
NEUTROPHILS # BLD AUTO: 4.26 X10(3) UL (ref 1.5–7.7)
NEUTROPHILS NFR BLD AUTO: 56.4 %
NONHDLC SERPL-MCNC: 140 MG/DL (ref ?–130)
OSMOLALITY SERPL CALC.SUM OF ELEC: 288 MOSM/KG (ref 275–295)
PLATELET # BLD AUTO: 280 10(3)UL (ref 150–450)
POTASSIUM SERPL-SCNC: 4.5 MMOL/L (ref 3.5–5.1)
PROT SERPL-MCNC: 7.9 G/DL (ref 6.4–8.2)
PSA SERPL-MCNC: 0.52 NG/ML (ref ?–4)
RBC # BLD AUTO: 5.38 X10(6)UL
SODIUM SERPL-SCNC: 139 MMOL/L (ref 136–145)
T4 SERPL-MCNC: 10.3 UG/DL
TRIGL SERPL-MCNC: 204 MG/DL (ref 30–149)
TSI SER-ACNC: 1.83 MIU/ML (ref 0.36–3.74)
VLDLC SERPL CALC-MCNC: 35 MG/DL (ref 0–30)
WBC # BLD AUTO: 7.6 X10(3) UL (ref 4–11)

## 2022-01-03 PROCEDURE — 36415 COLL VENOUS BLD VENIPUNCTURE: CPT | Performed by: INTERNAL MEDICINE

## 2022-01-03 PROCEDURE — 84436 ASSAY OF TOTAL THYROXINE: CPT | Performed by: INTERNAL MEDICINE

## 2022-01-03 PROCEDURE — 80061 LIPID PANEL: CPT

## 2022-01-03 PROCEDURE — 85025 COMPLETE CBC W/AUTO DIFF WBC: CPT | Performed by: INTERNAL MEDICINE

## 2022-01-03 PROCEDURE — 83036 HEMOGLOBIN GLYCOSYLATED A1C: CPT | Performed by: INTERNAL MEDICINE

## 2022-01-03 PROCEDURE — 87086 URINE CULTURE/COLONY COUNT: CPT

## 2022-01-03 PROCEDURE — 80053 COMPREHEN METABOLIC PANEL: CPT | Performed by: INTERNAL MEDICINE

## 2022-01-03 PROCEDURE — 84443 ASSAY THYROID STIM HORMONE: CPT | Performed by: INTERNAL MEDICINE

## 2022-01-03 PROCEDURE — 84153 ASSAY OF PSA TOTAL: CPT | Performed by: INTERNAL MEDICINE

## 2022-01-08 ENCOUNTER — LAB REQUISITION (OUTPATIENT)
Dept: SURGERY | Age: 47
End: 2022-01-08
Payer: COMMERCIAL

## 2022-01-08 DIAGNOSIS — Z01.818 PREOP EXAMINATION: ICD-10-CM

## 2022-01-09 LAB — SARS-COV-2 RNA RESP QL NAA+PROBE: NOT DETECTED

## 2022-01-10 ENCOUNTER — TELEPHONE (OUTPATIENT)
Dept: GASTROENTEROLOGY | Facility: CLINIC | Age: 47
End: 2022-01-10

## 2022-01-10 NOTE — TELEPHONE ENCOUNTER
Scheduled for colonoscopy with Dr. Stevan Badillo Tuesday (tomorrow), 01/11/2022. Discussed bowel prep instructions and time intervals for split dosing. Reviewed acceptable clear liquids and what is allowed.      Expressed understanding and appreciative for

## 2022-01-11 ENCOUNTER — TELEPHONE (OUTPATIENT)
Dept: GASTROENTEROLOGY | Facility: CLINIC | Age: 47
End: 2022-01-11

## 2022-01-11 ENCOUNTER — SURGERY CENTER DOCUMENTATION (OUTPATIENT)
Dept: SURGERY | Age: 47
End: 2022-01-11

## 2022-01-11 PROCEDURE — 45378 DIAGNOSTIC COLONOSCOPY: CPT | Performed by: INTERNAL MEDICINE

## 2022-01-11 NOTE — TELEPHONE ENCOUNTER
Entered into Epic. Recall CLN in 5 years per Dr. Henna Elizabeth. Last CLN done 01/11/2022. Recall entered into Patient Outreach for 01/11/2027. Health Maintenance updated.

## 2022-01-11 NOTE — PROCEDURES
COLONOSCOPY REPORT    Tip Schaefer     1975 Age 51year old   PCP Rafaela Alvarez MD Endoscopist Du Swain MD     Date of procedure: 22    Procedure: Colonoscopy    Pre-operative diagnosis: screening, multiple extended family with co inflammation. 6. COLLEEN: normal rectal tone, no masses palpated. Recommend:  · Repeat colonoscopy in 5 years-- was coughing through out procedure and difficult to do full exam. Does have chronic lung disease.  Lightened anesthesia but still was coughin

## 2022-01-26 ENCOUNTER — TELEPHONE (OUTPATIENT)
Dept: PULMONOLOGY | Facility: CLINIC | Age: 47
End: 2022-01-26

## 2022-01-26 RX ORDER — AZITHROMYCIN 250 MG/1
TABLET, FILM COATED ORAL
Refills: 0 | Status: CANCELLED | OUTPATIENT
Start: 2022-01-26

## 2022-01-26 RX ORDER — LEVOFLOXACIN 500 MG/1
500 TABLET, FILM COATED ORAL DAILY
Qty: 7 TABLET | Refills: 0 | Status: SHIPPED | OUTPATIENT
Start: 2022-01-26

## 2022-01-26 RX ORDER — LEVOFLOXACIN 500 MG/1
500 TABLET, FILM COATED ORAL DAILY
Qty: 7 TABLET | Refills: 0 | Status: SHIPPED | OUTPATIENT
Start: 2022-01-26 | End: 2022-01-26

## 2022-01-26 NOTE — TELEPHONE ENCOUNTER
Pt called to speak to RN about a chronic productive cough that he has been getting worse. Please call.

## 2022-01-26 NOTE — TELEPHONE ENCOUNTER
Spoke with patient states he has been coughing a lot, yellow sputum, has coughing fits and then vomits, nasal congestion with yellow discharge, headache and abdomen soreness from coughing.   Over the weekend felt achy and slept for 11 hours, COVID negative,

## 2022-01-26 NOTE — TELEPHONE ENCOUNTER
Levaquin prescription sent to [facility] cancelled per Dr. Kassy Driscoll request.    Stanley Bentley with patient informed him of Dr. Kassy Driscoll order. Patient verbalized understanding.   Follow up appointment scheduled with Dr. Zackary Hernandez on 3/3/22 at 12:30pm.  Eugenio Mazariegos

## 2022-01-26 NOTE — TELEPHONE ENCOUNTER
RN, I just sent in a prescription for Levaquin. Unfortunately, I first sent in to [facility]. Can you just cancel that prescription. I sent a second prescription to the [facility].

## 2022-02-18 ENCOUNTER — TELEPHONE (OUTPATIENT)
Dept: PULMONOLOGY | Facility: CLINIC | Age: 47
End: 2022-02-18

## 2022-02-18 NOTE — TELEPHONE ENCOUNTER
Patient is due for Ct Chest on 4/26/21  Ordered on 10/27/21  Letter sent to patient,     Manage Care please obtain PA for patient.  Thanks

## 2022-03-03 ENCOUNTER — OFFICE VISIT (OUTPATIENT)
Dept: PULMONOLOGY | Facility: CLINIC | Age: 47
End: 2022-03-03
Payer: COMMERCIAL

## 2022-03-03 VITALS
BODY MASS INDEX: 26.16 KG/M2 | DIASTOLIC BLOOD PRESSURE: 87 MMHG | HEIGHT: 73 IN | RESPIRATION RATE: 16 BRPM | WEIGHT: 197.38 LBS | OXYGEN SATURATION: 97 % | HEART RATE: 70 BPM | SYSTOLIC BLOOD PRESSURE: 139 MMHG

## 2022-03-03 DIAGNOSIS — R05.9 COUGH: Primary | ICD-10-CM

## 2022-03-03 PROCEDURE — 99213 OFFICE O/P EST LOW 20 MIN: CPT | Performed by: INTERNAL MEDICINE

## 2022-03-03 PROCEDURE — 3079F DIAST BP 80-89 MM HG: CPT | Performed by: INTERNAL MEDICINE

## 2022-03-03 PROCEDURE — 3008F BODY MASS INDEX DOCD: CPT | Performed by: INTERNAL MEDICINE

## 2022-03-03 PROCEDURE — 3075F SYST BP GE 130 - 139MM HG: CPT | Performed by: INTERNAL MEDICINE

## 2022-03-03 RX ORDER — LEVOFLOXACIN 500 MG/1
500 TABLET, FILM COATED ORAL DAILY
Qty: 7 TABLET | Refills: 0 | Status: SHIPPED | OUTPATIENT
Start: 2022-03-03

## 2022-03-03 RX ORDER — FLUTICASONE PROPIONATE AND SALMETEROL 250; 50 UG/1; UG/1
1 POWDER RESPIRATORY (INHALATION) EVERY 12 HOURS SCHEDULED
Qty: 1 EACH | Refills: 5 | Status: SHIPPED | OUTPATIENT
Start: 2022-03-03

## 2022-03-03 RX ORDER — ALBUTEROL SULFATE 2.5 MG/3ML
2.5 SOLUTION RESPIRATORY (INHALATION) EVERY 6 HOURS PRN
Qty: 90 EACH | Refills: 5 | Status: SHIPPED | OUTPATIENT
Start: 2022-03-03

## 2022-03-08 ENCOUNTER — TELEPHONE (OUTPATIENT)
Dept: PULMONOLOGY | Facility: CLINIC | Age: 47
End: 2022-03-08

## 2022-03-08 NOTE — TELEPHONE ENCOUNTER
Received fax from [address] [place] [address] for Nebulizer orders.  Placed on Dr. Jemima Parham folder for review,

## 2022-03-28 RX ORDER — SIMVASTATIN 20 MG
TABLET ORAL
Qty: 90 TABLET | Refills: 3 | Status: SHIPPED | OUTPATIENT
Start: 2022-03-28

## 2022-04-08 ENCOUNTER — TELEPHONE (OUTPATIENT)
Dept: PULMONOLOGY | Facility: CLINIC | Age: 47
End: 2022-04-08

## 2022-04-08 ENCOUNTER — HOSPITAL ENCOUNTER (OUTPATIENT)
Dept: GENERAL RADIOLOGY | Age: 47
Discharge: HOME | End: 2022-04-08
Attending: INTERNAL MEDICINE
Payer: COMMERCIAL

## 2022-04-08 DIAGNOSIS — R06.00 DYSPNEA, UNSPECIFIED TYPE: ICD-10-CM

## 2022-04-08 PROCEDURE — 71046 X-RAY EXAM CHEST 2 VIEWS: CPT | Performed by: INTERNAL MEDICINE

## 2022-04-08 RX ORDER — MONTELUKAST SODIUM 10 MG/1
TABLET ORAL
Qty: 90 TABLET | Refills: 0 | Status: SHIPPED | OUTPATIENT
Start: 2022-04-08

## 2022-04-08 RX ORDER — AMOXICILLIN AND CLAVULANATE POTASSIUM 500; 125 MG/1; MG/1
1 TABLET, FILM COATED ORAL 2 TIMES DAILY
Qty: 20 TABLET | Refills: 0 | Status: SHIPPED | OUTPATIENT
Start: 2022-04-08

## 2022-04-08 NOTE — TELEPHONE ENCOUNTER
----- Message from Isabella Ham DO sent at 4/8/2022  1:55 PM CDT -----  I am not sure why this patient's results came into my inbox. It is Dr. Gregg Hollingsworth patient but there are some pertinent findings that need to be followed up with state the nodularity in his right upper lobe has increased in size.   Can you forward this to Dr. Adebayo Amin

## 2022-04-08 NOTE — TELEPHONE ENCOUNTER
RN, I spoke with the patient regarding his chest x-ray and sent in a prescription for Augmentin.   Please add to the calendar a repeat chest x-ray at the 3-month interval.

## 2022-04-08 NOTE — TELEPHONE ENCOUNTER
Josh Garcia     Please see Dr. Cary Carrero message below regarding CT results for this patient and advise.   Thank you

## 2022-05-04 ENCOUNTER — TELEPHONE (OUTPATIENT)
Dept: PULMONOLOGY | Facility: CLINIC | Age: 47
End: 2022-05-04

## 2022-05-04 RX ORDER — LEVOFLOXACIN 750 MG/1
750 TABLET ORAL DAILY
Qty: 10 TABLET | Refills: 0 | Status: SHIPPED | OUTPATIENT
Start: 2022-05-04 | End: 2022-05-14

## 2022-05-04 NOTE — TELEPHONE ENCOUNTER
Pt notified of Dr. Aponte Fine orders below. Explained script was rcvd by pharmacy. Reassured him. He voiced understanding.

## 2022-05-04 NOTE — TELEPHONE ENCOUNTER
Patient requesting to speak with nurse regarding a deep cough he has in his chest. Please call at [phone],thanks.

## 2022-05-04 NOTE — TELEPHONE ENCOUNTER
C/o deep resonating cough which sounds like a bark (has become productive), yellow sputum, & fatigue onset Sunday. Denies any other sx. Stts taking Delsym & it does not seem to be working. Pt voices frustration w/ constantly not feeling well (bronchiectasis). Confirmed pt using acapella, montelukast, albuterol nebulizer, albuterol inhaler, & fluticasone propionate as ordered. Reassurance provided. Pt aware not to use albuterol inhaler & neb @ same time since same med, RN will d/w MD, & f/u thereafter. Per pt he is not using Wixela d/t cost ($300+).

## 2022-05-26 ENCOUNTER — TELEPHONE (OUTPATIENT)
Dept: PULMONOLOGY | Facility: CLINIC | Age: 47
End: 2022-05-26

## 2022-05-27 ENCOUNTER — TELEPHONE (OUTPATIENT)
Dept: PULMONOLOGY | Facility: CLINIC | Age: 47
End: 2022-05-27

## 2022-05-27 RX ORDER — LEVOFLOXACIN 750 MG/1
750 TABLET ORAL DAILY
Qty: 10 TABLET | Refills: 0 | Status: SHIPPED | OUTPATIENT
Start: 2022-05-27 | End: 2022-06-06

## 2022-05-27 NOTE — TELEPHONE ENCOUNTER
Spoke with patient informed him of Dr. Miles Ragsdale message/order. Patient verbalized understanding.

## 2022-05-27 NOTE — TELEPHONE ENCOUNTER
Pt is calling to follow up, he was doing well for a while and now he is coughing again and bringing up yellow sputum. Jose Antonio Su and fernandated. Jose Antonio Su

## 2022-05-27 NOTE — TELEPHONE ENCOUNTER
Spoke with patient states he has a productive cough with yellow sputum, chest congestion, nasal congestion with yellow mucous, shortness of breath with activity. Denies fevers, chest pain. Was given Levaquin 750mg x 10 days on 5/4/22. Felt better, now coughing again. Aware Dr. Cary Rose is not in the office, call routed to on-call provider. Confirmed local pharmacy, [facility] in [facility].

## 2022-05-27 NOTE — TELEPHONE ENCOUNTER
Spoke with patient informed him of Dr. Stevan euceda. Patient voiced frustration at Dr. Stevan euceda and his chronic condition. Patient states he has appointment with Dr. Luana Fregoso on 6/7/22.

## 2022-05-27 NOTE — TELEPHONE ENCOUNTER
Since already received prolonged course of antibiotics I would not recommend further antibiotics .   If not feeling well to go to ER otherwise to follow-up with Dr. Berenice Amaya in 1 to 2 weeks

## 2022-05-27 NOTE — TELEPHONE ENCOUNTER
RN, I am okay with giving him another 10 days of Levaquin. I know him well. He has bronchiectasis which is severe.

## 2022-06-06 ENCOUNTER — TELEPHONE (OUTPATIENT)
Dept: PULMONOLOGY | Facility: CLINIC | Age: 47
End: 2022-06-06

## 2022-06-06 NOTE — TELEPHONE ENCOUNTER
pt wants to know if Dr Ary Woody wants to see him sooner then 10/10/2022 for f/up? Pt states that he was not able to keep tomorrows appt due to conflict.

## 2022-06-08 ENCOUNTER — TELEPHONE (OUTPATIENT)
Dept: PULMONOLOGY | Facility: CLINIC | Age: 47
End: 2022-06-08

## 2022-06-22 ENCOUNTER — HOSPITAL ENCOUNTER (OUTPATIENT)
Dept: GENERAL RADIOLOGY | Age: 47
Discharge: HOME | End: 2022-06-22
Attending: INTERNAL MEDICINE
Payer: COMMERCIAL

## 2022-06-22 DIAGNOSIS — R93.89 ABNORMAL CHEST X-RAY: ICD-10-CM

## 2022-06-22 PROCEDURE — 71046 X-RAY EXAM CHEST 2 VIEWS: CPT | Performed by: INTERNAL MEDICINE

## 2022-06-28 ENCOUNTER — OFFICE VISIT (OUTPATIENT)
Dept: PULMONOLOGY | Facility: CLINIC | Age: 47
End: 2022-06-28
Payer: COMMERCIAL

## 2022-06-28 VITALS
OXYGEN SATURATION: 97 % | BODY MASS INDEX: 24.92 KG/M2 | HEIGHT: 73 IN | SYSTOLIC BLOOD PRESSURE: 132 MMHG | HEART RATE: 58 BPM | WEIGHT: 188 LBS | DIASTOLIC BLOOD PRESSURE: 82 MMHG

## 2022-06-28 DIAGNOSIS — J47.9 BRONCHIECTASIS WITHOUT COMPLICATION (HCC): Primary | ICD-10-CM

## 2022-06-28 PROCEDURE — 3075F SYST BP GE 130 - 139MM HG: CPT | Performed by: INTERNAL MEDICINE

## 2022-06-28 PROCEDURE — 99213 OFFICE O/P EST LOW 20 MIN: CPT | Performed by: INTERNAL MEDICINE

## 2022-06-28 PROCEDURE — 3079F DIAST BP 80-89 MM HG: CPT | Performed by: INTERNAL MEDICINE

## 2022-06-28 PROCEDURE — 3008F BODY MASS INDEX DOCD: CPT | Performed by: INTERNAL MEDICINE

## 2022-07-06 RX ORDER — MONTELUKAST SODIUM 10 MG/1
TABLET ORAL
Qty: 90 TABLET | Refills: 3 | Status: SHIPPED | OUTPATIENT
Start: 2022-07-06

## 2022-07-13 ENCOUNTER — LABORATORY ENCOUNTER (OUTPATIENT)
Dept: LAB | Age: 47
End: 2022-07-13
Attending: INTERNAL MEDICINE
Payer: COMMERCIAL

## 2022-07-13 DIAGNOSIS — J47.9 BRONCHIECTASIS WITHOUT COMPLICATION (HCC): ICD-10-CM

## 2022-07-13 PROCEDURE — 87102 FUNGUS ISOLATION CULTURE: CPT

## 2022-07-13 PROCEDURE — 87106 FUNGI IDENTIFICATION YEAST: CPT

## 2022-07-13 PROCEDURE — 87070 CULTURE OTHR SPECIMN AEROBIC: CPT

## 2022-07-13 PROCEDURE — 87206 SMEAR FLUORESCENT/ACID STAI: CPT

## 2022-07-13 PROCEDURE — 87116 MYCOBACTERIA CULTURE: CPT

## 2022-07-13 PROCEDURE — 87205 SMEAR GRAM STAIN: CPT

## 2022-07-13 PROCEDURE — 87077 CULTURE AEROBIC IDENTIFY: CPT

## 2022-07-13 PROCEDURE — 87186 SC STD MICRODIL/AGAR DIL: CPT

## 2022-07-14 ENCOUNTER — TELEPHONE (OUTPATIENT)
Dept: PULMONOLOGY | Facility: CLINIC | Age: 47
End: 2022-07-14

## 2022-07-14 ENCOUNTER — PATIENT MESSAGE (OUTPATIENT)
Dept: PULMONOLOGY | Facility: CLINIC | Age: 47
End: 2022-07-14

## 2022-07-14 RX ORDER — SULFAMETHOXAZOLE AND TRIMETHOPRIM 800; 160 MG/1; MG/1
1 TABLET ORAL 2 TIMES DAILY
Qty: 20 TABLET | Refills: 0 | Status: SHIPPED | OUTPATIENT
Start: 2022-07-14 | End: 2022-07-24

## 2022-07-14 NOTE — TELEPHONE ENCOUNTER
Dr. Radha Vega requesting results of sputum culture. Fungus/AFB still processing.  Sputum culture looks abnormal.

## 2022-07-14 NOTE — TELEPHONE ENCOUNTER
Spoke to Dr. Jacob Xiong-  Telephone order with [facility]:  Bactrim -160 1 tab BID x 10 days. Spoke to patient and informed him of order and answered all questions. Informed patient he would get a return call when all of his lab work is complete.

## 2022-07-18 NOTE — TELEPHONE ENCOUNTER
RN, please call the patient to let him know that his sputum culture did grow MRSA which was sensitive to the Bactrim which I have prescribed. No need to change treatment now.

## 2022-07-20 ENCOUNTER — HOSPITAL ENCOUNTER (OUTPATIENT)
Age: 47
Discharge: HOME | End: 2022-07-20
Payer: COMMERCIAL

## 2022-07-20 DIAGNOSIS — Z20.822 ENCOUNTER FOR LABORATORY TESTING FOR COVID-19 VIRUS: Primary | ICD-10-CM

## 2022-07-20 LAB — SARS-COV-2 RNA RESP QL NAA+PROBE: NOT DETECTED

## 2022-08-02 ENCOUNTER — HOSPITAL ENCOUNTER (OUTPATIENT)
Age: 47
Discharge: HOME | End: 2022-08-02
Payer: COMMERCIAL

## 2022-08-02 DIAGNOSIS — Z20.822 ENCOUNTER FOR LABORATORY TESTING FOR COVID-19 VIRUS: Primary | ICD-10-CM

## 2022-08-02 LAB — SARS-COV-2 RNA RESP QL NAA+PROBE: NOT DETECTED

## 2022-08-02 PROCEDURE — U0002 COVID-19 LAB TEST NON-CDC: HCPCS | Performed by: NURSE PRACTITIONER

## 2022-08-11 ENCOUNTER — TELEPHONE (OUTPATIENT)
Dept: PULMONOLOGY | Facility: CLINIC | Age: 47
End: 2022-08-11

## 2022-08-11 RX ORDER — FLUCONAZOLE 100 MG/1
100 TABLET ORAL 2 TIMES DAILY
Qty: 28 TABLET | Refills: 5 | Status: SHIPPED | OUTPATIENT
Start: 2022-08-11

## 2022-08-11 NOTE — TELEPHONE ENCOUNTER
I spoke to the patient and although the Candida and Saccharomyces may simply be contaminant, because he has the refractory cough with bronchiectasis, I am going to offer a course of Diflucan 100 mg twice daily for 2 weeks and see how he does clinically.

## 2022-08-29 ENCOUNTER — HOSPITAL ENCOUNTER (OUTPATIENT)
Dept: CT IMAGING | Facility: HOSPITAL | Age: 47
Discharge: HOME | End: 2022-08-29
Attending: INTERNAL MEDICINE
Payer: COMMERCIAL

## 2022-08-29 DIAGNOSIS — J47.9 BRONCHIECTASIS WITHOUT COMPLICATION (HCC): ICD-10-CM

## 2022-08-29 LAB
CREAT BLD-MCNC: 0.8 MG/DL
GFR SERPLBLD BASED ON 1.73 SQ M-ARVRAT: 110 ML/MIN/1.73M2 (ref 60–?)

## 2022-08-29 PROCEDURE — 82565 ASSAY OF CREATININE: CPT

## 2022-08-29 PROCEDURE — 71260 CT THORAX DX C+: CPT | Performed by: INTERNAL MEDICINE

## 2022-08-31 ENCOUNTER — TELEPHONE (OUTPATIENT)
Dept: PULMONOLOGY | Facility: CLINIC | Age: 47
End: 2022-08-31

## 2022-08-31 RX ORDER — DOXYCYCLINE 100 MG/1
100 CAPSULE ORAL 2 TIMES DAILY
Qty: 28 CAPSULE | Refills: 1 | Status: SHIPPED | OUTPATIENT
Start: 2022-08-31

## 2022-10-04 ENCOUNTER — OFFICE VISIT (OUTPATIENT)
Dept: PULMONOLOGY | Facility: CLINIC | Age: 47
End: 2022-10-04
Payer: COMMERCIAL

## 2022-10-04 VITALS
DIASTOLIC BLOOD PRESSURE: 88 MMHG | HEIGHT: 73 IN | BODY MASS INDEX: 24.78 KG/M2 | SYSTOLIC BLOOD PRESSURE: 137 MMHG | WEIGHT: 187 LBS | OXYGEN SATURATION: 93 % | HEART RATE: 60 BPM

## 2022-10-04 DIAGNOSIS — J47.9 BRONCHIECTASIS WITHOUT COMPLICATION (HCC): Primary | ICD-10-CM

## 2022-10-04 PROCEDURE — 3075F SYST BP GE 130 - 139MM HG: CPT | Performed by: INTERNAL MEDICINE

## 2022-10-04 PROCEDURE — 3079F DIAST BP 80-89 MM HG: CPT | Performed by: INTERNAL MEDICINE

## 2022-10-04 PROCEDURE — 3008F BODY MASS INDEX DOCD: CPT | Performed by: INTERNAL MEDICINE

## 2022-10-04 PROCEDURE — 99213 OFFICE O/P EST LOW 20 MIN: CPT | Performed by: INTERNAL MEDICINE

## 2022-11-07 ENCOUNTER — TELEPHONE (OUTPATIENT)
Dept: PULMONOLOGY | Facility: CLINIC | Age: 47
End: 2022-11-07

## 2022-11-07 RX ORDER — SULFAMETHOXAZOLE AND TRIMETHOPRIM 800; 160 MG/1; MG/1
1 TABLET ORAL 2 TIMES DAILY
Qty: 28 TABLET | Refills: 0 | Status: SHIPPED | OUTPATIENT
Start: 2022-11-07

## 2022-11-07 NOTE — TELEPHONE ENCOUNTER
Dr. Jenna Trujillo - Please review/sign pended order for Bactrim DS BID 14 days, dispense 28 tablets

## 2022-11-07 NOTE — TELEPHONE ENCOUNTER
Spoke with patient states last 2 weeks has chest congestion and productive cough with dark brown/yellow sputum, shortness of breath with activity. Denies fever, chest pain, chest tightness, wheezing, nasal discharge. Takes montelukast for allergies.

## 2022-11-07 NOTE — TELEPHONE ENCOUNTER
Pt is calling wanting to speak with Dr. Addis Dickinson or  himself. He is having a trouble breathing, coughing up brown phlegm.     Had COVID booster and flu shot

## 2022-12-16 ENCOUNTER — TELEPHONE (OUTPATIENT)
Dept: INTERNAL MEDICINE CLINIC | Facility: CLINIC | Age: 47
End: 2022-12-16

## 2022-12-16 RX ORDER — LISINOPRIL AND HYDROCHLOROTHIAZIDE 20; 12.5 MG/1; MG/1
1 TABLET ORAL DAILY
Qty: 90 TABLET | Refills: 0 | Status: SHIPPED | OUTPATIENT
Start: 2022-12-16

## 2022-12-19 ENCOUNTER — OFFICE VISIT (OUTPATIENT)
Dept: INTERNAL MEDICINE CLINIC | Facility: CLINIC | Age: 47
End: 2022-12-19
Payer: COMMERCIAL

## 2022-12-19 VITALS
HEIGHT: 73 IN | HEART RATE: 76 BPM | BODY MASS INDEX: 24.78 KG/M2 | DIASTOLIC BLOOD PRESSURE: 80 MMHG | WEIGHT: 187 LBS | TEMPERATURE: 99 F | SYSTOLIC BLOOD PRESSURE: 106 MMHG

## 2022-12-19 DIAGNOSIS — I10 ESSENTIAL HYPERTENSION, BENIGN: ICD-10-CM

## 2022-12-19 DIAGNOSIS — Z00.00 ANNUAL PHYSICAL EXAM: Primary | ICD-10-CM

## 2022-12-19 DIAGNOSIS — E78.00 PURE HYPERCHOLESTEROLEMIA: ICD-10-CM

## 2022-12-19 PROCEDURE — 99396 PREV VISIT EST AGE 40-64: CPT | Performed by: INTERNAL MEDICINE

## 2022-12-19 PROCEDURE — 3008F BODY MASS INDEX DOCD: CPT | Performed by: INTERNAL MEDICINE

## 2022-12-19 PROCEDURE — 3074F SYST BP LT 130 MM HG: CPT | Performed by: INTERNAL MEDICINE

## 2022-12-19 PROCEDURE — 3079F DIAST BP 80-89 MM HG: CPT | Performed by: INTERNAL MEDICINE

## 2022-12-19 PROCEDURE — 90471 IMMUNIZATION ADMIN: CPT | Performed by: INTERNAL MEDICINE

## 2022-12-19 PROCEDURE — 90677 PCV20 VACCINE IM: CPT | Performed by: INTERNAL MEDICINE

## 2022-12-19 RX ORDER — BRINZOLAMIDE/BRIMONIDINE TARTRATE 10; 2 MG/ML; MG/ML
SUSPENSION/ DROPS OPHTHALMIC
COMMUNITY
Start: 2022-10-06

## 2022-12-19 NOTE — ASSESSMENT & PLAN NOTE
Lipids are controlled. , labs soon . Problem: Hemodialysis  Goal: Dialysis: Safe, effective, and comfortable hemodialysis treatment (Hemodialysis nurse only)  Outcome: Outcome Met, Continue evaluating goal progress toward completion  HD tx time met as ordered, UF goal met. Transfused PRBCs x1 unit without reaction.  Pt had no complaints post HD tx.    Goal: Dialysis: Free of complications related to initiation/termination of dialysis (Hemodialysis nurse only)  Outcome: Outcome Met, Continue evaluating goal progress toward completion  IJ cath with good flow, Na Citrate instilled.

## 2022-12-19 NOTE — ASSESSMENT & PLAN NOTE
Physical today . Labs soon   Had c scope in Jan 2022, repeat in 5 years. prevnar today   Tdap future.

## 2022-12-30 ENCOUNTER — HOSPITAL ENCOUNTER (OUTPATIENT)
Age: 47
Discharge: HOME | End: 2022-12-30
Payer: COMMERCIAL

## 2022-12-30 ENCOUNTER — TELEPHONE (OUTPATIENT)
Dept: PULMONOLOGY | Facility: CLINIC | Age: 47
End: 2022-12-30

## 2022-12-30 VITALS
HEART RATE: 97 BPM | OXYGEN SATURATION: 96 % | TEMPERATURE: 99 F | RESPIRATION RATE: 20 BRPM | DIASTOLIC BLOOD PRESSURE: 82 MMHG | SYSTOLIC BLOOD PRESSURE: 130 MMHG

## 2022-12-30 DIAGNOSIS — J11.1 INFLUENZA: ICD-10-CM

## 2022-12-30 DIAGNOSIS — R05.1 ACUTE COUGH: Primary | ICD-10-CM

## 2022-12-30 LAB
POCT INFLUENZA A: POSITIVE
POCT INFLUENZA B: NEGATIVE
SARS-COV-2 RNA RESP QL NAA+PROBE: NOT DETECTED

## 2022-12-30 RX ORDER — OSELTAMIVIR PHOSPHATE 75 MG/1
75 CAPSULE ORAL 2 TIMES DAILY
Qty: 10 CAPSULE | Refills: 0 | Status: SHIPPED | OUTPATIENT
Start: 2022-12-30 | End: 2023-01-04

## 2022-12-30 RX ORDER — BENZONATATE 100 MG/1
100 CAPSULE ORAL 3 TIMES DAILY PRN
Qty: 30 CAPSULE | Refills: 0 | Status: SHIPPED | OUTPATIENT
Start: 2022-12-30 | End: 2023-01-29

## 2022-12-30 NOTE — ED INITIAL ASSESSMENT (HPI)
Pt states having been coughing with congestion and colorful mucus, Pt states having fevers on and off. Pt states last night waking up at 3am sweating and having body aches. Pt states when having some cold sore around mouth which is normal for him when he gets sick like this. Pt states having symptoms almost for 10 days. Pt states has been seeing a pulmonologist for 3 years.

## 2022-12-30 NOTE — TELEPHONE ENCOUNTER
Spoke with states he has had fever, body aches, cough for past weak. States he is coughing up brown phlegm. States last night woke up at 3 am in a sweat. States his fever broke today. Has not tested for COVID. Let him know he should get tested/ States he has been trying Delsym/robitussin for cough. Uses albuterol prn. Pt states he is going to go to [address] [place] [address] for evaluation today.

## 2022-12-30 NOTE — TELEPHONE ENCOUNTER
Pt states that he is coughing a lot, fever, achy joints, chest feels tight. Pt states that he has had a fever on and off.

## 2023-01-06 ENCOUNTER — LAB ENCOUNTER (OUTPATIENT)
Dept: LAB | Age: 48
End: 2023-01-06
Attending: INTERNAL MEDICINE
Payer: COMMERCIAL

## 2023-01-07 LAB
ABSOLUTE BASOPHILS: 30 CELLS/UL (ref 0–200)
ABSOLUTE EOSINOPHILS: 98 CELLS/UL (ref 15–500)
ABSOLUTE LYMPHOCYTES: 1373 CELLS/UL (ref 850–3900)
ABSOLUTE MONOCYTES: 713 CELLS/UL (ref 200–950)
ABSOLUTE NEUTROPHILS: 5288 CELLS/UL (ref 1500–7800)
ALBUMIN/GLOBULIN RATIO: 1.2 (CALC) (ref 1–2.5)
ALBUMIN: 4.1 G/DL (ref 3.6–5.1)
ALKALINE PHOSPHATASE: 91 U/L (ref 36–130)
ALT: 17 U/L (ref 9–46)
AST: 15 U/L (ref 10–40)
BASOPHILS: 0.4 %
BILIRUBIN, TOTAL: 0.6 MG/DL (ref 0.2–1.2)
BUN: 11 MG/DL (ref 7–25)
CALCIUM: 9.4 MG/DL (ref 8.6–10.3)
CARBON DIOXIDE: 26 MMOL/L (ref 20–32)
CHLORIDE: 100 MMOL/L (ref 98–110)
CHOL/HDLC RATIO: 3.8 (CALC)
CHOLESTEROL, TOTAL: 141 MG/DL
CREATININE: 0.69 MG/DL (ref 0.6–1.29)
EGFR: 114 ML/MIN/1.73M2
EOSINOPHILS: 1.3 %
GLOBULIN: 3.3 G/DL (CALC) (ref 1.9–3.7)
GLUCOSE: 99 MG/DL (ref 65–99)
HDL CHOLESTEROL: 37 MG/DL
HEMATOCRIT: 44.7 % (ref 38.5–50)
HEMOGLOBIN A1C: 5.2 % OF TOTAL HGB
HEMOGLOBIN: 15 G/DL (ref 13.2–17.1)
LDL-CHOLESTEROL: 80 MG/DL (CALC)
LYMPHOCYTES: 18.3 %
MCH: 28.8 PG (ref 27–33)
MCHC: 33.6 G/DL (ref 32–36)
MCV: 85.8 FL (ref 80–100)
MONOCYTES: 9.5 %
MPV: 9.8 FL (ref 7.5–12.5)
NEUTROPHILS: 70.5 %
NON-HDL CHOLESTEROL: 104 MG/DL (CALC)
PLATELET COUNT: 308 THOUSAND/UL (ref 140–400)
POTASSIUM: 4.2 MMOL/L (ref 3.5–5.3)
PROTEIN, TOTAL: 7.4 G/DL (ref 6.1–8.1)
PSA, TOTAL: 0.37 NG/ML
RDW: 13.5 % (ref 11–15)
RED BLOOD CELL COUNT: 5.21 MILLION/UL (ref 4.2–5.8)
SODIUM: 136 MMOL/L (ref 135–146)
T4 (THYROXINE), TOTAL: 8.3 MCG/DL (ref 4.9–10.5)
TRIGLYCERIDES: 147 MG/DL
TSH: 0.78 MIU/L (ref 0.4–4.5)
WHITE BLOOD CELL COUNT: 7.5 THOUSAND/UL (ref 3.8–10.8)

## 2023-01-13 ENCOUNTER — HOSPITAL ENCOUNTER (OUTPATIENT)
Age: 48
Discharge: HOME | End: 2023-01-13
Payer: COMMERCIAL

## 2023-01-13 ENCOUNTER — TELEPHONE (OUTPATIENT)
Dept: PULMONOLOGY | Facility: CLINIC | Age: 48
End: 2023-01-13

## 2023-01-13 ENCOUNTER — APPOINTMENT (OUTPATIENT)
Dept: GENERAL RADIOLOGY | Age: 48
End: 2023-01-13
Attending: NURSE PRACTITIONER
Payer: COMMERCIAL

## 2023-01-13 VITALS
HEART RATE: 108 BPM | SYSTOLIC BLOOD PRESSURE: 106 MMHG | TEMPERATURE: 97 F | DIASTOLIC BLOOD PRESSURE: 83 MMHG | RESPIRATION RATE: 20 BRPM | OXYGEN SATURATION: 95 %

## 2023-01-13 DIAGNOSIS — J18.9 PNEUMONIA OF BOTH LUNGS DUE TO INFECTIOUS ORGANISM, UNSPECIFIED PART OF LUNG: Primary | ICD-10-CM

## 2023-01-13 LAB — SARS-COV-2 RNA RESP QL NAA+PROBE: NOT DETECTED

## 2023-01-13 PROCEDURE — U0002 COVID-19 LAB TEST NON-CDC: HCPCS | Performed by: NURSE PRACTITIONER

## 2023-01-13 PROCEDURE — 71046 X-RAY EXAM CHEST 2 VIEWS: CPT | Performed by: NURSE PRACTITIONER

## 2023-01-13 PROCEDURE — 99214 OFFICE O/P EST MOD 30 MIN: CPT | Performed by: NURSE PRACTITIONER

## 2023-01-13 RX ORDER — AZITHROMYCIN 250 MG/1
TABLET, FILM COATED ORAL
Qty: 6 TABLET | Refills: 0 | Status: SHIPPED | OUTPATIENT
Start: 2023-01-13 | End: 2023-01-18

## 2023-01-13 RX ORDER — AMOXICILLIN AND CLAVULANATE POTASSIUM 875; 125 MG/1; MG/1
1 TABLET, FILM COATED ORAL 2 TIMES DAILY
Qty: 20 TABLET | Refills: 0 | Status: SHIPPED | OUTPATIENT
Start: 2023-01-13 | End: 2023-01-23

## 2023-01-13 RX ORDER — AZITHROMYCIN 250 MG/1
TABLET, FILM COATED ORAL
Qty: 6 TABLET | Refills: 0 | Status: SHIPPED | OUTPATIENT
Start: 2023-01-13 | End: 2023-01-13

## 2023-01-13 RX ORDER — AMOXICILLIN AND CLAVULANATE POTASSIUM 875; 125 MG/1; MG/1
1 TABLET, FILM COATED ORAL 2 TIMES DAILY
Qty: 20 TABLET | Refills: 0 | Status: SHIPPED | OUTPATIENT
Start: 2023-01-13 | End: 2023-01-13

## 2023-01-13 NOTE — TELEPHONE ENCOUNTER
Dr. Corey Richmond, patient reports very bad, worsening productive cough (dark brown, hard and liquid mucus). Positive for the flu per UC 12/30/22, took benzonatate and Tamiflu. He had fever yesterday, is coughing so hard he's been throwing up, having diarrhea, fatigue, and aches. He is very uncomfortable. Been taking Tylenol. Albuterol helps with occasional SOB. Advised patient he may need to go to UC/ER if symptoms worsen. He understands, but is hesitant.

## 2023-01-13 NOTE — TELEPHONE ENCOUNTER
Dr. Garry Barthel, see below. Patient went to urgent care today for intense, productive cough the past couple weeks. Positive for the flu on 12/30/22. Had chest x-ray and started on z-princess and augmentin per UC. Should patient be seen for follow up? Spoke with patient to check in, he understands plan and that we will be in touch with FU appt if needed.

## 2023-01-13 NOTE — ED INITIAL ASSESSMENT (HPI)
Pt states has been sick since having the flu 3 weeks ago, pt states has a lung condition but has continued cough and having fevers recently. Pt states cough became worse earlier this week.

## 2023-01-13 NOTE — TELEPHONE ENCOUNTER
Patient is calling because he is having coughing and head congestion, vomiting, fatigue, body aches.  He tested negative for covid and was positive for the flu 2 weeks ago

## 2023-01-13 NOTE — DISCHARGE INSTRUCTIONS
Go to ER for shortness of breath, new or worsening symptoms  Continue with tylenol or ibuprofen for fever or aches   Follow up with dr Vito Wasserman next week  Take antibiotics as directed  Continue with your albuterol inhaler

## 2023-01-17 NOTE — TELEPHONE ENCOUNTER
Left message to call back to ask if patient can see Dr. Letitia Alberto 1/23/23 at 4:15pm. Please confirm with patient if appt time works.

## 2023-01-23 ENCOUNTER — OFFICE VISIT (OUTPATIENT)
Dept: PULMONOLOGY | Facility: CLINIC | Age: 48
End: 2023-01-23

## 2023-01-23 VITALS
HEART RATE: 70 BPM | RESPIRATION RATE: 16 BRPM | BODY MASS INDEX: 24.25 KG/M2 | HEIGHT: 73 IN | DIASTOLIC BLOOD PRESSURE: 79 MMHG | OXYGEN SATURATION: 98 % | SYSTOLIC BLOOD PRESSURE: 134 MMHG | WEIGHT: 183 LBS

## 2023-01-23 DIAGNOSIS — J47.9 BRONCHIECTASIS WITHOUT COMPLICATION (HCC): Primary | ICD-10-CM

## 2023-01-23 PROCEDURE — 3078F DIAST BP <80 MM HG: CPT | Performed by: INTERNAL MEDICINE

## 2023-01-23 PROCEDURE — 3008F BODY MASS INDEX DOCD: CPT | Performed by: INTERNAL MEDICINE

## 2023-01-23 PROCEDURE — 3075F SYST BP GE 130 - 139MM HG: CPT | Performed by: INTERNAL MEDICINE

## 2023-01-23 PROCEDURE — 99213 OFFICE O/P EST LOW 20 MIN: CPT | Performed by: INTERNAL MEDICINE

## 2023-01-23 RX ORDER — AMOXICILLIN AND CLAVULANATE POTASSIUM 500; 125 MG/1; MG/1
1 TABLET, FILM COATED ORAL 2 TIMES DAILY
Qty: 8 TABLET | Refills: 0 | Status: SHIPPED | OUTPATIENT
Start: 2023-01-23

## 2023-01-23 NOTE — PROGRESS NOTES
The patient is a 48-year-old male who I know well from prior evaluation comes in now for follow-up. He had influenza a month ago. He recovered and then developed a pneumonia 2 weeks ago. He was given azithromycin and Augmentin and did well clinically and is almost back to his baseline but not yet. He still has some phlegm production with slight yellowness. Review of Systems:  Vision normal. Ear nose and throat normal. Bowel normal. Bladder function normal. No depression. No thyroid disease. No lymphatic system concerns. No rash. Muscles and joints unremarkable. No weight loss no weight gain. Physical Examination:  Vital signs normal. HEENT examination is unremarkable with pupils equal round and reactive to light and accommodation. Neck without adenopathy, thyromegaly, JVD nor bruit. Lungs bibasilar crackles to auscultation and percussion. Cardiac regular rate and rhythm no murmur. Abdomen nontender, without hepatosplenomegaly and no mass appreciable. Extremities and Musculoskeletal without clubbing cyanosis nor edema, and mobility acceptable. Neurologic grossly intact with symmetric tone and strength and reflex. Assessment and plan:  1. Bronchiectasis status post recent pneumonia-still has some crackles but I have identified these crackles on prior exam and I suspect they are chronic. I will give 4 more days of Augmentin and patient to contact me promptly if problem recurs. Otherwise, he should see me again at the 6 to 12-month interval or sooner if needed and continue with the Acapella flutter valve.

## 2023-01-31 ENCOUNTER — HOSPITAL ENCOUNTER (OUTPATIENT)
Age: 48
Discharge: HOME | End: 2023-01-31
Payer: COMMERCIAL

## 2023-01-31 VITALS
RESPIRATION RATE: 20 BRPM | DIASTOLIC BLOOD PRESSURE: 86 MMHG | SYSTOLIC BLOOD PRESSURE: 124 MMHG | HEART RATE: 79 BPM | OXYGEN SATURATION: 98 % | TEMPERATURE: 98 F

## 2023-01-31 DIAGNOSIS — B37.0 THRUSH, ORAL: Primary | ICD-10-CM

## 2023-01-31 LAB — S PYO AG THROAT QL: NEGATIVE

## 2023-01-31 PROCEDURE — 99213 OFFICE O/P EST LOW 20 MIN: CPT | Performed by: NURSE PRACTITIONER

## 2023-01-31 PROCEDURE — 87880 STREP A ASSAY W/OPTIC: CPT | Performed by: NURSE PRACTITIONER

## 2023-01-31 NOTE — DISCHARGE INSTRUCTIONS
Yogurt, kefir or sourkraut in the diet can help to support the recovery from antibiotic use  Use the nystatin oral solution as directed for treatment of thrush  Limit sugar, carbohydrates as these can feed the thrush  Good oral hygiene, warm salt water gargles swish and spit after eating

## 2023-01-31 NOTE — ED INITIAL ASSESSMENT (HPI)
Pt came in due to soreness in mouth and throat for the past 2 days. Pt stated he feels like he has a fungal infection in mouth. Pt has easy non labored respirations.

## 2023-03-10 RX ORDER — SIMVASTATIN 20 MG
TABLET ORAL
Qty: 90 TABLET | Refills: 3 | Status: SHIPPED | OUTPATIENT
Start: 2023-03-10

## 2023-03-31 RX ORDER — FLUTICASONE PROPIONATE 50 MCG
SPRAY, SUSPENSION (ML) NASAL
Qty: 48 G | Refills: 3 | Status: SHIPPED | OUTPATIENT
Start: 2023-03-31

## 2023-03-31 NOTE — TELEPHONE ENCOUNTER
LOV: 1/23/2023  Last refill: 12/30/2021    Dr. Jennifer Wiley review and sign pended prescription if agreeable.

## 2023-04-20 ENCOUNTER — APPOINTMENT (OUTPATIENT)
Dept: GENERAL RADIOLOGY | Age: 48
End: 2023-04-20
Attending: NURSE PRACTITIONER
Payer: COMMERCIAL

## 2023-04-20 ENCOUNTER — HOSPITAL ENCOUNTER (OUTPATIENT)
Age: 48
Discharge: HOME | End: 2023-04-20
Payer: COMMERCIAL

## 2023-04-20 VITALS
OXYGEN SATURATION: 98 % | SYSTOLIC BLOOD PRESSURE: 99 MMHG | HEART RATE: 77 BPM | TEMPERATURE: 97 F | DIASTOLIC BLOOD PRESSURE: 65 MMHG | RESPIRATION RATE: 18 BRPM

## 2023-04-20 DIAGNOSIS — R05.1 ACUTE COUGH: ICD-10-CM

## 2023-04-20 DIAGNOSIS — J20.9 ACUTE BRONCHITIS, UNSPECIFIED ORGANISM: Primary | ICD-10-CM

## 2023-04-20 PROCEDURE — 71046 X-RAY EXAM CHEST 2 VIEWS: CPT | Performed by: NURSE PRACTITIONER

## 2023-04-20 PROCEDURE — 99213 OFFICE O/P EST LOW 20 MIN: CPT | Performed by: NURSE PRACTITIONER

## 2023-04-20 RX ORDER — BENZONATATE 200 MG/1
200 CAPSULE ORAL 3 TIMES DAILY PRN
Qty: 15 CAPSULE | Refills: 0 | Status: SHIPPED | OUTPATIENT
Start: 2023-04-20

## 2023-04-20 RX ORDER — PREDNISONE 20 MG/1
40 TABLET ORAL DAILY
Qty: 10 TABLET | Refills: 0 | Status: SHIPPED | OUTPATIENT
Start: 2023-04-20 | End: 2023-04-25

## 2023-04-20 NOTE — ED INITIAL ASSESSMENT (HPI)
Pt here with cough for 2 weeks. Cough is intermittently productive with clear to green sputum. Denies any other symptoms.

## 2023-05-06 NOTE — TELEPHONE ENCOUNTER
MS RN NOTES  PAIN MANAGEMENT

C/O RIGHT LEG PAIN 8/10 ON PAIN SCALE,NORCO 2 TAB PO GIVEN OK PATIENT REQUEST. Spoke with patient, follow up appointment scheduled with Dr. Kai White on 6/28/22 at 12:15pm.  Verified date, time,location & parking, patient verbalized understanding.

## 2023-06-21 RX ORDER — MONTELUKAST SODIUM 10 MG/1
10 TABLET ORAL NIGHTLY
Qty: 90 TABLET | Refills: 0 | Status: SHIPPED | OUTPATIENT
Start: 2023-06-21

## 2023-06-21 NOTE — TELEPHONE ENCOUNTER
90 day refill given on 06/21/23, appointment needed for further refills.       Requested Prescriptions   Pending Prescriptions Disp Refills    MONTELUKAST 10 MG Oral Tab [Pharmacy Med Name: MONTELUKAST SODIUM TABS 10MG] 90 tablet 3     Sig: TAKE 1 TABLET NIGHTLY       Asthma & COPD Medication Protocol Failed - 6/21/2023 10:13 AM        Failed - In person appointment or virtual visit in the past 6 mos or appointment in next 3 mos     Recent Outpatient Visits              4 months ago Bronchiectasis without complication [facility])    Angela Mcphreson MD    Office Visit    6 months ago Annual physical exam    David Renteria MD    Office Visit    8 months ago Bronchiectasis without complication [facility])    Angela Mcpherson MD    Office Visit    11 months ago Bronchiectasis without complication [facility])    Angela Mcpherson MD    Office Visit    1 year ago Nolberto Hanson MD    Office Visit                           Recent Outpatient Visits              4 months ago Bronchiectasis without complication [facility])    Angela Mcpherson MD    Office Visit    6 months ago Annual physical exam    David Renteria MD    Office Visit    8 months ago Bronchiectasis without complication [facility])    Angela Mcpherson MD    Office Visit    11 months ago Bronchiectasis without complication [facility])    Angela Mcpherson MD    Office Visit    1 year ago Cough    Jono Mcdonald, Nolberto Hennessy MD    Office Visit

## 2023-09-12 ENCOUNTER — TELEPHONE (OUTPATIENT)
Dept: PULMONOLOGY | Facility: CLINIC | Age: 48
End: 2023-09-12

## 2023-09-13 RX ORDER — NIRMATRELVIR AND RITONAVIR 300-100 MG
KIT ORAL
Qty: 30 TABLET | Refills: 0 | Status: SHIPPED | OUTPATIENT
Start: 2023-09-13 | End: 2023-09-18

## 2023-09-19 NOTE — TELEPHONE ENCOUNTER
Please Review.  Protocol failed or has no protocol  Requested Prescriptions   Pending Prescriptions Disp Refills    MONTELUKAST 10 MG Oral Tab [Pharmacy Med Name: MONTELUKAST SODIUM TABS 10MG] 90 tablet 3     Sig: TAKE 1 TABLET NIGHTLY (APPOINTMENT NEEDED FOR FURTHER REFILLS)       Asthma & COPD Medication Protocol Failed - 9/18/2023 11:14 PM        Failed - In person appointment or virtual visit in the past 6 mos or appointment in next 3 mos     Recent Outpatient Visits              7 months ago Bronchiectasis without complication [facility])    Lindsay Bolden MD    Office Visit    9 months ago Annual physical exam    Elaine Allan MD    Office Visit    11 months ago Bronchiectasis without complication [facility])    Lindsay Bolden MD    Office Visit    1 year ago Bronchiectasis without complication [facility])    Lindsay Bolden MD    Office Visit    1 year ago Fernie Jensen, Sang Salazar MD    Office Visit                          Recent Outpatient Visits              7 months ago Bronchiectasis without complication [facility])    Lindsay Bolden MD    Office Visit    9 months ago Annual physical exam    Elaine Allan MD    Office Visit    11 months ago Bronchiectasis without complication [facility])    Lindsay Bolden MD    Office Visit    1 year ago Bronchiectasis without complication [facility])    Lindsay Bolden MD    Office Visit    1 year ago Cough    [address], Sang Salazar MD Office Visit

## 2023-09-20 RX ORDER — MONTELUKAST SODIUM 10 MG/1
10 TABLET ORAL NIGHTLY
Qty: 90 TABLET | Refills: 3 | Status: SHIPPED | OUTPATIENT
Start: 2023-09-20

## 2023-11-17 RX ORDER — ALBUTEROL SULFATE 90 UG/1
AEROSOL, METERED RESPIRATORY (INHALATION)
Qty: 25.5 G | Refills: 3 | Status: SHIPPED | OUTPATIENT
Start: 2023-11-17

## 2023-11-17 NOTE — TELEPHONE ENCOUNTER
Pt needs refill for albuterol is out of it please follow up [address], [facility] [phone], [phone]

## 2023-11-19 ENCOUNTER — HOSPITAL ENCOUNTER (OUTPATIENT)
Age: 48
Discharge: HOME | End: 2023-11-19
Payer: COMMERCIAL

## 2023-11-19 VITALS
OXYGEN SATURATION: 98 % | TEMPERATURE: 97 F | HEART RATE: 68 BPM | RESPIRATION RATE: 18 BRPM | SYSTOLIC BLOOD PRESSURE: 119 MMHG | DIASTOLIC BLOOD PRESSURE: 75 MMHG

## 2023-11-19 DIAGNOSIS — Z86.16 HISTORY OF 2019 NOVEL CORONAVIRUS DISEASE (COVID-19): ICD-10-CM

## 2023-11-19 DIAGNOSIS — J06.9 VIRAL UPPER RESPIRATORY TRACT INFECTION: Primary | ICD-10-CM

## 2023-11-19 RX ORDER — BENZONATATE 100 MG/1
200 CAPSULE ORAL 3 TIMES DAILY PRN
Qty: 30 CAPSULE | Refills: 0 | Status: SHIPPED | OUTPATIENT
Start: 2023-11-19

## 2023-11-19 NOTE — ED INITIAL ASSESSMENT (HPI)
Pt came in due to cough and congestion for the past 2 days. Pt stated he tested positive for covid 2 months ago. Pt has easy non labored respirations.

## 2023-11-22 ENCOUNTER — TELEPHONE (OUTPATIENT)
Dept: PULMONOLOGY | Facility: CLINIC | Age: 48
End: 2023-11-22

## 2023-11-22 RX ORDER — SULFAMETHOXAZOLE AND TRIMETHOPRIM 800; 160 MG/1; MG/1
1 TABLET ORAL 2 TIMES DAILY
Qty: 28 TABLET | Refills: 0 | Status: SHIPPED | OUTPATIENT
Start: 2023-11-22

## 2023-11-22 NOTE — TELEPHONE ENCOUNTER
Spoke with patient states symptoms started Saturday, went to Urgent care Sunday, complains of cough with yellow-brown sputum, chest tightness, fever, chills, fatigue. Using tylenol, delsym, linn-seltzer cold and a neti pot, states he is not getting better.

## 2023-12-19 ENCOUNTER — OFFICE VISIT (OUTPATIENT)
Dept: INTERNAL MEDICINE CLINIC | Facility: CLINIC | Age: 48
End: 2023-12-19
Payer: COMMERCIAL

## 2023-12-19 VITALS
HEART RATE: 80 BPM | DIASTOLIC BLOOD PRESSURE: 78 MMHG | SYSTOLIC BLOOD PRESSURE: 106 MMHG | WEIGHT: 194 LBS | HEIGHT: 73 IN | TEMPERATURE: 99 F | BODY MASS INDEX: 25.71 KG/M2

## 2023-12-19 DIAGNOSIS — I10 ESSENTIAL HYPERTENSION, BENIGN: ICD-10-CM

## 2023-12-19 DIAGNOSIS — Z00.00 ANNUAL PHYSICAL EXAM: Primary | ICD-10-CM

## 2023-12-19 PROCEDURE — 90472 IMMUNIZATION ADMIN EACH ADD: CPT | Performed by: INTERNAL MEDICINE

## 2023-12-19 PROCEDURE — 3078F DIAST BP <80 MM HG: CPT | Performed by: INTERNAL MEDICINE

## 2023-12-19 PROCEDURE — 90471 IMMUNIZATION ADMIN: CPT | Performed by: INTERNAL MEDICINE

## 2023-12-19 PROCEDURE — 3074F SYST BP LT 130 MM HG: CPT | Performed by: INTERNAL MEDICINE

## 2023-12-19 PROCEDURE — 3008F BODY MASS INDEX DOCD: CPT | Performed by: INTERNAL MEDICINE

## 2023-12-19 PROCEDURE — 99396 PREV VISIT EST AGE 40-64: CPT | Performed by: INTERNAL MEDICINE

## 2023-12-19 PROCEDURE — 90686 IIV4 VACC NO PRSV 0.5 ML IM: CPT | Performed by: INTERNAL MEDICINE

## 2023-12-19 PROCEDURE — 90715 TDAP VACCINE 7 YRS/> IM: CPT | Performed by: INTERNAL MEDICINE

## 2023-12-19 RX ORDER — DORZOLAMIDE HCL 20 MG/ML
SOLUTION/ DROPS OPHTHALMIC 3 TIMES DAILY
COMMUNITY

## 2023-12-19 RX ORDER — BRIMONIDINE TARTRATE 1 MG/ML
1 SOLUTION/ DROPS OPHTHALMIC EVERY 8 HOURS
COMMUNITY

## 2024-01-22 ENCOUNTER — LAB ENCOUNTER (OUTPATIENT)
Dept: LAB | Age: 49
End: 2024-01-22
Attending: INTERNAL MEDICINE
Payer: COMMERCIAL

## 2024-01-22 LAB
ALBUMIN SERPL-MCNC: 4.4 G/DL (ref 3.2–4.8)
ALBUMIN/GLOB SERPL: 1.5 {RATIO} (ref 1–2)
ALP LIVER SERPL-CCNC: 96 U/L
ALT SERPL-CCNC: 59 U/L
ANION GAP SERPL CALC-SCNC: 9 MMOL/L (ref 0–18)
AST SERPL-CCNC: 28 U/L (ref ?–34)
BASOPHILS # BLD AUTO: 0.06 X10(3) UL (ref 0–0.2)
BASOPHILS NFR BLD AUTO: 1 %
BILIRUB SERPL-MCNC: 0.6 MG/DL (ref 0.3–1.2)
BUN BLD-MCNC: 12 MG/DL (ref 9–23)
BUN/CREAT SERPL: 17.1 (ref 10–20)
CALCIUM BLD-MCNC: 9.2 MG/DL (ref 8.7–10.4)
CHLORIDE SERPL-SCNC: 104 MMOL/L (ref 98–112)
CHOLEST SERPL-MCNC: 156 MG/DL (ref ?–200)
CO2 SERPL-SCNC: 24 MMOL/L (ref 21–32)
CREAT BLD-MCNC: 0.7 MG/DL
DEPRECATED RDW RBC AUTO: 40.2 FL (ref 35.1–46.3)
EGFRCR SERPLBLD CKD-EPI 2021: 113 ML/MIN/1.73M2 (ref 60–?)
EOSINOPHIL # BLD AUTO: 0.24 X10(3) UL (ref 0–0.7)
EOSINOPHIL NFR BLD AUTO: 4.1 %
ERYTHROCYTE [DISTWIDTH] IN BLOOD BY AUTOMATED COUNT: 13 % (ref 11–15)
EST. AVERAGE GLUCOSE BLD GHB EST-MCNC: 108 MG/DL (ref 68–126)
FASTING PATIENT LIPID ANSWER: YES
FASTING STATUS PATIENT QL REPORTED: YES
GLOBULIN PLAS-MCNC: 3 G/DL (ref 2.8–4.4)
GLUCOSE BLD-MCNC: 106 MG/DL (ref 70–99)
HBA1C MFR BLD: 5.4 % (ref ?–5.7)
HCT VFR BLD AUTO: 44.9 %
HDLC SERPL-MCNC: 44 MG/DL (ref 40–59)
HGB BLD-MCNC: 15.6 G/DL
IMM GRANULOCYTES # BLD AUTO: 0.02 X10(3) UL (ref 0–1)
IMM GRANULOCYTES NFR BLD: 0.3 %
LDLC SERPL CALC-MCNC: 87 MG/DL (ref ?–100)
LYMPHOCYTES # BLD AUTO: 1.44 X10(3) UL (ref 1–4)
LYMPHOCYTES NFR BLD AUTO: 24.4 %
MCH RBC QN AUTO: 29.7 PG (ref 26–34)
MCHC RBC AUTO-ENTMCNC: 34.7 G/DL (ref 31–37)
MCV RBC AUTO: 85.4 FL
MONOCYTES # BLD AUTO: 0.78 X10(3) UL (ref 0.1–1)
MONOCYTES NFR BLD AUTO: 13.2 %
NEUTROPHILS # BLD AUTO: 3.35 X10 (3) UL (ref 1.5–7.7)
NEUTROPHILS # BLD AUTO: 3.35 X10(3) UL (ref 1.5–7.7)
NEUTROPHILS NFR BLD AUTO: 57 %
NONHDLC SERPL-MCNC: 112 MG/DL (ref ?–130)
OSMOLALITY SERPL CALC.SUM OF ELEC: 284 MOSM/KG (ref 275–295)
PLATELET # BLD AUTO: 235 10(3)UL (ref 150–450)
POTASSIUM SERPL-SCNC: 4.2 MMOL/L (ref 3.5–5.1)
PROT SERPL-MCNC: 7.4 G/DL (ref 5.7–8.2)
RBC # BLD AUTO: 5.26 X10(6)UL
SODIUM SERPL-SCNC: 137 MMOL/L (ref 136–145)
T4 SERPL-MCNC: 6.9 UG/DL
TRIGL SERPL-MCNC: 145 MG/DL (ref 30–149)
TSI SER-ACNC: 1.4 MIU/ML (ref 0.55–4.78)
VLDLC SERPL CALC-MCNC: 23 MG/DL (ref 0–30)
WBC # BLD AUTO: 5.9 X10(3) UL (ref 4–11)

## 2024-01-22 PROCEDURE — 83036 HEMOGLOBIN GLYCOSYLATED A1C: CPT | Performed by: INTERNAL MEDICINE

## 2024-01-22 PROCEDURE — 80061 LIPID PANEL: CPT | Performed by: INTERNAL MEDICINE

## 2024-01-22 PROCEDURE — 80053 COMPREHEN METABOLIC PANEL: CPT | Performed by: INTERNAL MEDICINE

## 2024-01-22 PROCEDURE — 85025 COMPLETE CBC W/AUTO DIFF WBC: CPT | Performed by: INTERNAL MEDICINE

## 2024-01-22 PROCEDURE — 36415 COLL VENOUS BLD VENIPUNCTURE: CPT | Performed by: INTERNAL MEDICINE

## 2024-01-22 PROCEDURE — 84443 ASSAY THYROID STIM HORMONE: CPT | Performed by: INTERNAL MEDICINE

## 2024-01-22 PROCEDURE — 84436 ASSAY OF TOTAL THYROXINE: CPT | Performed by: INTERNAL MEDICINE

## 2024-02-29 RX ORDER — LISINOPRIL AND HYDROCHLOROTHIAZIDE 20; 12.5 MG/1; MG/1
1 TABLET ORAL DAILY
Qty: 90 TABLET | Refills: 3 | Status: SHIPPED | OUTPATIENT
Start: 2024-02-29

## 2024-02-29 NOTE — TELEPHONE ENCOUNTER
Refill passed per [facility] protocol.  Requested Prescriptions   Pending Prescriptions Disp Refills    LISINOPRIL-HYDROCHLOROTHIAZIDE 20-12.5 MG Oral Tab [Pharmacy Med Name: LISINOPRIL/HCTZ TABS 20/12.5MG] 90 tablet 3     Sig: TAKE 1 TABLET DAILY       Hypertension Medications Protocol Passed - 2/28/2024 11:10 PM        Passed - CMP or BMP in past 12 months        Passed - Last BP reading less than 140/90     BP Readings from Last 1 Encounters:   12/19/23 106/78               Passed - In person appointment or virtual visit in the past 12 mos or appointment in next 3 mos     Recent Outpatient Visits              2 months ago Annual physical exam    [facility], [facility] Mario Snider MD    Office Visit    1 year ago Bronchiectasis without complication (HCC)    [facility], David Fuller MD    Office Visit    1 year ago Annual physical exam    [facility], [facility] Mario Snider MD    Office Visit    1 year ago [facility] complication (HCC[facility]    [facility]    1 year ago [facility]ct[facility] complication (HCC[facility]    [facility]                      Passed - EGFRCR or GFRNAA > 50     GFR Evaluation  EGFRCR: 113 , resulted on 1/22/2024             Recent Outpatient Visits              2 months ago Annual physical exam    [facility], [facility] Mario Ball MD    Office Visit    1 year ago Bronchiectasis without complication (HCC)    [facility]    [facility] Visit    1 year ago Annual physical exam    [facility], [facility] Mario Ball MD    Office Visit    1 year ago Bronchiectasis without complication (HCC)     [facility], [facility], David Fuller MD    Office Visit    1 year ago Bronchiectasis without complication (HCC)    [facility], [facility], David Fuller MD    Office Visit

## 2024-03-04 RX ORDER — SIMVASTATIN 20 MG
TABLET ORAL
Qty: 90 TABLET | Refills: 3 | OUTPATIENT
Start: 2024-03-04

## 2024-03-08 RX ORDER — SIMVASTATIN 20 MG
20 TABLET ORAL NIGHTLY
Qty: 90 TABLET | Refills: 3 | Status: SHIPPED | OUTPATIENT
Start: 2024-03-08

## 2024-03-08 RX ORDER — FLUTICASONE PROPIONATE 50 MCG
1 SPRAY, SUSPENSION (ML) NASAL 2 TIMES DAILY
Qty: 48 G | Refills: 3 | Status: SHIPPED | OUTPATIENT
Start: 2024-03-08

## 2024-03-08 NOTE — TELEPHONE ENCOUNTER
Dr Lazo- please sign pended order for fluticasone if agreeable.    LOV: 1/23/23 Last refill: 3/31/23

## 2024-03-08 NOTE — TELEPHONE ENCOUNTER
Refill passed per [facility] protocol.  Requested Prescriptions   Pending Prescriptions Disp Refills    simvastatin 20 MG Oral Tab 90 tablet 3     Sig: Take 1 tablet (20 mg total) by mouth nightly.       Cholesterol Medication Protocol Passed - 3/8/2024 12:06 PM        Passed - ALT < 80     Lab Results   Component Value Date    ALT 59 (H) 01/22/2024             Passed - ALT resulted within past year        Passed - Lipid panel within past 12 months     Lab Results   Component Value Date    CHOLEST 156 01/22/2024    TRIG 145 01/22/2024    HDL 44 01/22/2024    LDL 87 01/22/2024    VLDL 23 01/22/2024    TCHDLRATIO 3.8 01/06/2023    NONHDLC 112 01/22/2024             Passed - In person appointment or virtual visit in the past 12 mos or appointment in next 3 mos     Recent Outpatient Visits              2 months ago Annual physical exam    [facility], [facility] Mario Snider MD    Office Visit    1 year ago Bronchiectasis without complication (HCC)    [facility], [facility], David Fuller MD    Office Visit    1 year ago Annual physical exam    [facility] Mario Snider MD    Office Visit    1 year ago [facility]    [facility]    1 year ago Bronchiectasis without complication (HCC)    [facility], David Fuller MD    Office Visit                         Recent Outpatient Visits              2 months ago Annual physical exam    [facility] Mario Snider MD    Office Visit    1 year ago Bronchiectasis without complication (HCC)    [facility], David Fuller MD    Office Visit    1 year ago Annual physical exam    [facility]  Mario Ball MD    Office Visit    1 year ago Bronchiectasis without complication (HCC)    [facility], [facility], David Fuller MD    Office Visit    1 year ago Bronchiectasis without complication (HCC)    [facility], [facility], David Fuller MD    Office Visit

## 2024-04-29 ENCOUNTER — OFFICE VISIT (OUTPATIENT)
Dept: INTERNAL MEDICINE CLINIC | Facility: CLINIC | Age: 49
End: 2024-04-29
Payer: COMMERCIAL

## 2024-04-29 VITALS
TEMPERATURE: 99 F | HEIGHT: 73 IN | HEART RATE: 68 BPM | WEIGHT: 195 LBS | OXYGEN SATURATION: 99 % | DIASTOLIC BLOOD PRESSURE: 90 MMHG | BODY MASS INDEX: 25.84 KG/M2 | SYSTOLIC BLOOD PRESSURE: 128 MMHG

## 2024-04-29 DIAGNOSIS — S39.012A STRAIN OF LUMBAR REGION, INITIAL ENCOUNTER: Primary | ICD-10-CM

## 2024-04-29 PROCEDURE — 99214 OFFICE O/P EST MOD 30 MIN: CPT | Performed by: INTERNAL MEDICINE

## 2024-04-29 PROCEDURE — 96127 BRIEF EMOTIONAL/BEHAV ASSMT: CPT | Performed by: INTERNAL MEDICINE

## 2024-04-29 RX ORDER — BRIMONIDINE TARTRATE 2 MG/ML
SOLUTION/ DROPS OPHTHALMIC 2 TIMES DAILY
COMMUNITY
Start: 2024-04-13

## 2024-04-29 RX ORDER — CYCLOBENZAPRINE HCL 10 MG
10 TABLET ORAL NIGHTLY
Qty: 30 TABLET | Refills: 1 | Status: SHIPPED | OUTPATIENT
Start: 2024-04-29

## 2024-04-29 NOTE — PROGRESS NOTES
HPI:    Patient ID: Antonio Chou is a 48 year old male.    Back Pain  Pertinent negatives include no chest pain, dysuria or headaches.   patient had been doing gardening , strain his back and then it got worse 3 days ago .   He feels knots left lower back area.   Worse when getting up from supine position and twisting in either direction , and raising his leg.   No bowel or bladder issues, no pain radiating down the leg.     Review of Systems   Constitutional: Negative.    HENT: Negative.     Eyes: Negative.    Respiratory: Negative.     Cardiovascular:  Negative for chest pain and leg swelling.   Gastrointestinal: Negative.    Endocrine: Negative.    Genitourinary:  Negative for decreased urine volume, difficulty urinating, dysuria, flank pain, frequency, hematuria and urgency.   Musculoskeletal:  Positive for back pain and myalgias. Negative for arthralgias, gait problem and joint swelling.   Skin:  Negative for color change, rash and wound.   Allergic/Immunologic: Negative.    Neurological: Negative.  Negative for headaches.   Psychiatric/Behavioral:  Negative for agitation and confusion. The patient is not nervous/anxious.             Current Outpatient Medications   Medication Sig Dispense Refill    brimonidine 0.2 % Ophthalmic Solution 2 (two) times a day.      cyclobenzaprine 10 MG Oral Tab Take 1 tablet (10 mg total) by mouth nightly. 30 tablet 1    fluticasone propionate 50 MCG/ACT Nasal Suspension 1 spray by Nasal route 2 (two) times daily. 48 g 3    simvastatin 20 MG Oral Tab Take 1 tablet (20 mg total) by mouth nightly. 90 tablet 3    lisinopril-hydroCHLOROthiazide 20-12.5 MG Oral Tab Take 1 tablet by mouth daily. 90 tablet 3    dorzolamide 2 % Ophthalmic Solution 2 (two) times a day.      montelukast 10 MG Oral Tab Take 1 tablet (10 mg total) by mouth nightly. 90 tablet 3    latanoprost (XALATAN) 0.005 % Ophthalmic Solution nightly.      brimonidine 0.1 % Ophthalmic Solution 1 drop every 8 (eight)  hours.      albuterol 108 (90 Base) MCG/ACT Inhalation Aero Soln USE 2 INHALATIONS EVERY 4 TO 6 HOURS AS NEEDED FOR SHORTNESS OF BREATH OR WHEEZING 25.5 g 3    albuterol (2.5 MG/3ML) 0.083% Inhalation Nebu Soln Take 3 mL (2.5 mg total) by nebulization every 6 (six) hours as needed for Wheezing. 90 each 5     Allergies:  Allergies   Allergen Reactions    Seasonal Runny nose      PHYSICAL EXAM:   /90   Pulse 68   Temp 98.5 °F (36.9 °C)   Ht 6' 1\" (1.854 m)   Wt 195 lb (88.5 kg)   SpO2 99%   BMI 25.73 kg/m²      Physical Exam  Constitutional:       General: He is not in acute distress.     Appearance: Normal appearance. He is well-developed. He is not diaphoretic.   HENT:      Right Ear: External ear normal.      Left Ear: External ear normal.      Nose: Nose normal.      Mouth/Throat:      Pharynx: No oropharyngeal exudate.   Eyes:      General: No scleral icterus.        Right eye: No discharge.         Left eye: No discharge.      Conjunctiva/sclera: Conjunctivae normal.      Pupils: Pupils are equal, round, and reactive to light.   Neck:      Thyroid: No thyromegaly.      Vascular: No JVD.      Trachea: No tracheal deviation.   Cardiovascular:      Rate and Rhythm: Normal rate and regular rhythm.      Heart sounds: Normal heart sounds. No murmur heard.     No friction rub. No gallop.   Pulmonary:      Effort: Pulmonary effort is normal. No respiratory distress.      Breath sounds: Normal breath sounds. No wheezing or rales.   Chest:      Chest wall: No tenderness.   Abdominal:      General: Bowel sounds are normal. There is no distension.      Palpations: Abdomen is soft. There is no mass.      Tenderness: There is no abdominal tenderness. There is no guarding.   Musculoskeletal:         General: No tenderness.      Cervical back: Normal range of motion and neck supple.      Comments: Palpable spasm in the left lower paravertebral muscles, positive left leg raise.    Lymphadenopathy:      Cervical: No  cervical adenopathy.   Skin:     General: Skin is warm and dry.      Coloration: Skin is not pale.      Findings: No erythema or rash.   Neurological:      Mental Status: He is alert and oriented to person, place, and time.      Cranial Nerves: No cranial nerve deficit.      Motor: No abnormal muscle tone.      Coordination: Coordination normal.      Deep Tendon Reflexes: Reflexes normal.   Psychiatric:         Behavior: Behavior normal.         Thought Content: Thought content normal.         Judgment: Judgment normal.                ASSESSMENT/PLAN:   No problem-specific Assessment & Plan notes found for this encounter.      No orders of the defined types were placed in this encounter.      Meds This Visit:  Requested Prescriptions     Signed Prescriptions Disp Refills    cyclobenzaprine 10 MG Oral Tab 30 tablet 1     Sig: Take 1 tablet (10 mg total) by mouth nightly.       Imaging & Referrals:  PHYSICAL THERAPY EXTERNAL       ID#[id number]

## 2024-04-29 NOTE — ASSESSMENT & PLAN NOTE
Flexeril 10 mg hs prn , if no better have physical therapy , if still having pain return to the office.

## 2024-09-12 ENCOUNTER — HOSPITAL ENCOUNTER (OUTPATIENT)
Age: 49
Discharge: HOME | End: 2024-09-12
Payer: COMMERCIAL

## 2024-09-12 VITALS
DIASTOLIC BLOOD PRESSURE: 84 MMHG | HEART RATE: 62 BPM | SYSTOLIC BLOOD PRESSURE: 137 MMHG | TEMPERATURE: 99 F | OXYGEN SATURATION: 99 % | RESPIRATION RATE: 18 BRPM

## 2024-09-12 DIAGNOSIS — S99.912A INJURY OF LEFT ANKLE, INITIAL ENCOUNTER: Primary | ICD-10-CM

## 2024-09-12 DIAGNOSIS — S80.212A ABRASION OF LEFT KNEE, INITIAL ENCOUNTER: ICD-10-CM

## 2024-09-12 PROCEDURE — L4350 ANKLE CONTROL ORTHO PRE OTS: HCPCS | Performed by: NURSE PRACTITIONER

## 2024-09-12 PROCEDURE — 99213 OFFICE O/P EST LOW 20 MIN: CPT | Performed by: NURSE PRACTITIONER

## 2024-09-12 PROCEDURE — E0114 CRUTCH UNDERARM PAIR NO WOOD: HCPCS | Performed by: NURSE PRACTITIONER

## 2024-09-12 NOTE — ED INITIAL ASSESSMENT (HPI)
Pt states around 89 today was playing basketball and when he jumped and landed seemed to roll his left ankle, pt states is painful and swollen.

## 2024-09-12 NOTE — ED PROVIDER NOTES
Patient Seen in: [facility]      History     Chief Complaint   Patient presents with    Leg or Foot Injury     Stated Complaint: Left ankle pain, swelling    Subjective:   47 y/o male with conditions as noted below presents with complaints of left ankle pain and swelling onset this afternoon.  States he was playing basketball, jumped and when he landed rolled his ankle.  Fell to his knee where he sustained an abrasion but has minimal pain to the knee at this time. Ambulatory with limp            Objective:   Past Medical History:    Bronchiectasis (HCC)    Finger infection    Glaucoma    Hyperlipidemia    Multiple allergies    Unspecified essential hypertension              Past Surgical History:   Procedure Laterality Date    Other surgical history      chest tube, right lung surgery     Other surgical history      pilonidal cyst                Social History     Socioeconomic History    Marital status: Life Partner    Number of children: 2   Occupational History    Occupation: recruiting   Tobacco Use    Smoking status: Never    Smokeless tobacco: Never   Vaping Use    Vaping status: Never Used   Substance and Sexual Activity    Alcohol use: Yes     Alcohol/week: 0.0 standard drinks of alcohol     Comment: Beer & wine, 3 drinks weekly    Drug use: Never   Other Topics Concern    Caffeine Concern Yes     Comment: Tea, 4 X a week     Social Determinants of Health      [facility]    Social Connections    Received from [facility]    Housing Stability              Review of Systems   Musculoskeletal:  Positive for joint swelling.   Neurological:  Negative for numbness.   All other systems reviewed and are negative.      Positive for stated Chief Complaint: Leg or Foot Injury    Other systems are as noted in HPI.  Constitutional and vital signs reviewed.      All other systems reviewed and  negative except as noted above.    Physical Exam     ED Triage Vitals [09/12/24 1841]   /84   Pulse 62   Resp 18   Temp 98.7 °F (37.1 °C)   Temp src Temporal   SpO2 99 %   O2 Device None (Room air)       Current Vitals:   Vital Signs  BP: 137/84  Pulse: 62  Resp: 18  Temp: 98.7 °F (37.1 °C)  Temp src: Temporal    Oxygen Therapy  SpO2: 99 %  O2 Device: None (Room air)            Physical Exam  Vitals and nursing note reviewed.   Constitutional:       General: He is not in acute distress.     Appearance: Normal appearance.   Pulmonary:      Effort: Pulmonary effort is normal.      Breath sounds: Normal breath sounds.   Musculoskeletal:         General: Swelling and tenderness present.      Left ankle: Swelling present. No deformity. Tenderness present over the lateral malleolus. Normal range of motion.      Comments: Left ankle: +STS laterally, tenderness on palpation. No deformity, ecchymosis. Zhao's test negative. 2+pedal/postibial pulses. FROM without difficulty.     Left knee: No STS to area. Superficial non bleeding abrasion to anterior knee. No deformity, ecchymosis. FROM   Skin:     General: Skin is warm and dry.      Capillary Refill: Capillary refill takes less than 2 seconds.   Neurological:      Mental Status: He is alert and oriented to person, place, and time.   Psychiatric:         Behavior: Behavior is cooperative.               ED Course   Labs Reviewed - No data to display                   MDM                                         Medical Decision Making  Patient is well-appearing. In NAD  I discussed differentials with patient including but not limited to sprain vs fracture  Discussed with patient that x-ray is not available at all [facility] at this time due to machine being down   Further discussed with patient that time of evening unable to send for an outpatient xray  Patient states is ok with splinting and crutches @ this time  Ankle splint applied by tech, crutches and  crutch walking instructions given by [employer]  Over-the-counter meds as needed  Patient follow-up with PCP or Ortho tomorrow.  Discussed with patient may be seen in one of her other immediate cares tomorrow or call [facility] to check status of x-ray and have x-ray done later.  Patient okay with plan      Problems Addressed:  Abrasion of left knee, initial encounter: acute illness or injury  Injury of left ankle, initial encounter: acute illness or injury        Disposition and Plan     Clinical Impression:  1. Injury of left ankle, initial encounter    2. Abrasion of left knee, initial encounter         Disposition:  Discharge  9/12/2024  7:04 pm    Follow-up:  Marlen Turner PA-C  [address]  [facility] [address]  [phone]    Schedule an appointment as soon as possible for a visit       Arpit Hsieh MD  [address]  [facility] [address]  [phone]                Medications Prescribed:  Discharge Medication List as of 9/12/2024  7:17 PM

## 2024-09-17 RX ORDER — MONTELUKAST SODIUM 10 MG/1
10 TABLET ORAL NIGHTLY
Qty: 90 TABLET | Refills: 3 | Status: SHIPPED | OUTPATIENT
Start: 2024-09-17

## 2024-09-17 NOTE — TELEPHONE ENCOUNTER
Please review. Protocol Failed; No Protocol    Requested Prescriptions   Pending Prescriptions Disp Refills    MONTELUKAST 10 MG Oral Tab [Pharmacy Med Name: MONTELUKAST SODIUM TABS 10MG] 90 tablet 3     Sig: TAKE 1 TABLET NIGHTLY       Asthma & COPD Medication Protocol Failed - 9/12/2024 11:17 PM        Failed - Asthma Action Score greater than or equal to 20        Failed - AAP/ACT given in last 12 months     No data recorded  No data recorded  No data recorded  No data recorded          Passed - Appointment in past 6 or next 3 months      Recent Outpatient Visits              4 months ago Strain of lumbar region, initial encounter    [facility], [facility] Mario Snider MD    Office Visit    9 months ago Annual physical exam    [facility] Mario Snider MD    Office Visit    1 year ago Bronchiectasis without complication (HCC)    [facility], David Fuller MD    Office Visit    1 year ago Annual physical exam    [facility] Mario Snider MD    Office Visit    1 year ago Bronchiectasis without complication (HCC)    [facility], David Fuller MD    Office Visit                               Recent Outpatient Visits              4 months ago Strain of lumbar region, initial encounter    [facility], [facility] Mario Snider MD    Office Visit    9 months ago Annual physical exam    [facility] Mario Meyers MD    Office Visit    1 year ago Bronchiectasis without complication (HCC)    [facility], David Fuller MD    Office Visit    1 year ago Annual physical exam    [facility] Mario Ball MD    Office Visit    1 year ago Bronchiectasis without  complication (HCC)    [facility], [facility], [place] David Lazo MD    Office Visit

## 2024-11-12 RX ORDER — ALBUTEROL SULFATE 90 UG/1
INHALANT RESPIRATORY (INHALATION)
Qty: 25.5 G | Refills: 3 | OUTPATIENT
Start: 2024-11-12

## 2024-11-12 NOTE — TELEPHONE ENCOUNTER
Last seen: 1/23/23  Suggested follow up: 6-12 months  Next appointment: none  Last refill: 11/17/23

## 2024-12-19 ENCOUNTER — OFFICE VISIT (OUTPATIENT)
Dept: INTERNAL MEDICINE CLINIC | Facility: CLINIC | Age: 49
End: 2024-12-19
Payer: COMMERCIAL

## 2024-12-19 VITALS
HEIGHT: 73 IN | BODY MASS INDEX: 25.98 KG/M2 | HEART RATE: 74 BPM | WEIGHT: 196 LBS | TEMPERATURE: 99 F | SYSTOLIC BLOOD PRESSURE: 106 MMHG | DIASTOLIC BLOOD PRESSURE: 64 MMHG | OXYGEN SATURATION: 98 %

## 2024-12-19 DIAGNOSIS — I10 ESSENTIAL HYPERTENSION, BENIGN: ICD-10-CM

## 2024-12-19 DIAGNOSIS — K40.90 INGUINAL HERNIA, RIGHT: ICD-10-CM

## 2024-12-19 DIAGNOSIS — M65.332 ACQUIRED TRIGGER FINGER OF LEFT MIDDLE FINGER: ICD-10-CM

## 2024-12-19 DIAGNOSIS — Z00.00 ANNUAL PHYSICAL EXAM: Primary | ICD-10-CM

## 2024-12-19 PROCEDURE — 99396 PREV VISIT EST AGE 40-64: CPT | Performed by: INTERNAL MEDICINE

## 2024-12-19 RX ORDER — VALACYCLOVIR HYDROCHLORIDE 1 G/1
1000 TABLET, FILM COATED ORAL 3 TIMES DAILY
COMMUNITY
Start: 2024-12-05 | End: 2024-12-19 | Stop reason: ALTCHOICE

## 2024-12-19 RX ORDER — MUPIROCIN 20 MG/G
OINTMENT TOPICAL
COMMUNITY
Start: 2024-12-05

## 2024-12-19 NOTE — H&P
HPI:    Patient ID: Antonio Chou is a 48 year old male.    HPIpatient is here for his annual physical . He has a bulge in the right groin , it has been there for 2 years, it causes discomfort no actual pain .  He has trigger finger in the late afternoons for the last 6 months of the left middle finger.   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   No other known activity which could cause it .   No chest pain , no shortness of breath .       Past Medical History:    Bronchiectasis (HCC)    Finger infection    Glaucoma    Hyperlipidemia    Multiple allergies    Unspecified essential hypertension       Past Surgical History:   Procedure Laterality Date    Other surgical history      chest tube, right lung surgery     Other surgical history      pilonidal cyst       Social History     Tobacco Use    Smoking status: Never    Smokeless tobacco: Never   Substance Use Topics    Alcohol use: Yes     Alcohol/week: 0.0 standard drinks of alcohol     Comment: Beer & wine, 3 drinks weekly       Family History   Problem Relation Age of Onset    Hypertension Father     Stroke Father         (cause of death)    Heart Disorder Father     Hypertension Mother     Lipids Mother     Cancer Maternal Grandmother         brain cancer, colon cancer.     Hypertension Maternal Grandmother     Heart Disorder Maternal Grandmother     Dementia Maternal Grandmother     Dementia Paternal Grandmother     Heart Disorder Paternal Grandfather     Genetic Disease Paternal Grandfather          ROS:   Review of Systems   Constitutional: Negative.    HENT: Negative.     Eyes: Negative.    Respiratory: Negative.     Cardiovascular:  Negative for chest pain and leg swelling.   Gastrointestinal: Negative.    Endocrine: Negative.    Genitourinary:  Negative for decreased urine volume, difficulty urinating, dysuria, flank pain, frequency, hematuria and urgency.   Musculoskeletal:  Negative for arthralgias, back pain, gait problem, joint swelling and  myalgias.   Skin:  Negative for color change, rash and wound.   Allergic/Immunologic: Negative.    Neurological: Negative.  Negative for headaches.   Psychiatric/Behavioral:  Negative for agitation and confusion. The patient is not nervous/anxious.             Current Outpatient Medications   Medication Sig Dispense Refill    montelukast 10 MG Oral Tab Take 1 tablet (10 mg total) by mouth nightly. 90 tablet 3    brimonidine 0.2 % Ophthalmic Solution 2 (two) times a day.      simvastatin 20 MG Oral Tab Take 1 tablet (20 mg total) by mouth nightly. 90 tablet 3    lisinopril-hydroCHLOROthiazide 20-12.5 MG Oral Tab Take 1 tablet by mouth daily. 90 tablet 3    dorzolamide 2 % Ophthalmic Solution 2 (two) times a day.      brimonidine 0.1 % Ophthalmic Solution 1 drop every 8 (eight) hours.      latanoprost (XALATAN) 0.005 % Ophthalmic Solution nightly.      mupirocin 2 % External Ointment APPLY THREE TIMES A DAY TO THE TIP OF THE NOSE. (Patient not taking: Reported on 12/19/2024)      cyclobenzaprine 10 MG Oral Tab Take 1 tablet (10 mg total) by mouth nightly. 30 tablet 1    fluticasone propionate 50 MCG/ACT Nasal Suspension 1 spray by Nasal route 2 (two) times daily. 48 g 3    albuterol 108 (90 Base) MCG/ACT Inhalation Aero Soln USE 2 INHALATIONS EVERY 4 TO 6 HOURS AS NEEDED FOR SHORTNESS OF BREATH OR WHEEZING 25.5 g 3    albuterol (2.5 MG/3ML) 0.083% Inhalation Nebu Soln Take 3 mL (2.5 mg total) by nebulization every 6 (six) hours as needed for Wheezing. 90 each 5     Allergies:Allergies[1]   PHYSICAL EXAM:   /64   Pulse 74   Temp 98.7 °F (37.1 °C)   Ht 6' 1\" (1.854 m)   Wt 196 lb (88.9 kg)   SpO2 98%   BMI 25.86 kg/m²     Physical Exam  Constitutional:       General: He is not in acute distress.     Appearance: Normal appearance. He is well-developed. He is not diaphoretic.   HENT:      Right Ear: External ear normal.      Left Ear: External ear normal.      Nose: Nose normal.      Mouth/Throat:       Pharynx: No oropharyngeal exudate.   Eyes:      General: No scleral icterus.        Right eye: No discharge.         Left eye: No discharge.      Conjunctiva/sclera: Conjunctivae normal.      Pupils: Pupils are equal, round, and reactive to light.   Neck:      Thyroid: No thyromegaly.      Vascular: No JVD.      Trachea: No tracheal deviation.   Cardiovascular:      Rate and Rhythm: Normal rate and regular rhythm.      Heart sounds: Normal heart sounds. No murmur heard.     No friction rub. No gallop.   Pulmonary:      Effort: Pulmonary effort is normal. No respiratory distress.      Breath sounds: Normal breath sounds. No wheezing or rales.   Chest:      Chest wall: No tenderness.   Abdominal:      General: Bowel sounds are normal. There is no distension.      Palpations: Abdomen is soft. There is no mass.      Tenderness: There is no abdominal tenderness. There is no guarding.      Hernia: A hernia is present. Hernia is present in the right inguinal area. There is no hernia in the left inguinal area.   Genitourinary:     Pubic Area: No rash.       Penis: Circumcised. No phimosis, paraphimosis, hypospadias, erythema, tenderness, discharge, swelling or lesions.       Testes: Normal. Cremasteric reflex is present.      Epididymis:      Right: Normal.      Left: Normal.      French stage (genital): 5.   Musculoskeletal:         General: No tenderness.        Hands:       Cervical back: Normal range of motion and neck supple.      Comments: Thickened tendon   Lymphadenopathy:      Cervical: No cervical adenopathy.      Lower Body: No right inguinal adenopathy. No left inguinal adenopathy.   Skin:     General: Skin is warm and dry.      Coloration: Skin is not pale.      Findings: No erythema or rash.   Neurological:      Mental Status: He is alert and oriented to person, place, and time.      Cranial Nerves: No cranial nerve deficit.      Motor: No abnormal muscle tone.      Coordination: Coordination normal.      Deep  Tendon Reflexes: Reflexes normal.   Psychiatric:         Behavior: Behavior normal.         Thought Content: Thought content normal.         Judgment: Judgment normal.                ASSESSMENT/PLAN:   Inguinal hernia, right  See surgery , referral given .     Essential hypertension, benign  Controlled on meds.     Annual physical exam  Phsyical today l  Labs after Jan . 24, 2025 .    Acquired trigger finger of left middle finger  Home exercises given .     Orders Placed This Encounter   Procedures    CBC With Differential With Platelet    Comp Metabolic Panel (14)    Hemoglobin A1C    Lipid Panel    Assay, Thyroid Stim Hormone    Free T4, (Free Thyroxine)    PSA Total, Screen       Meds This Visit:  Requested Prescriptions      No prescriptions requested or ordered in this encounter       Imaging & Referrals:  SURGERY - INTERNAL       ID#[id number]       [1]   Allergies  Allergen Reactions    Seasonal Runny nose

## 2025-01-08 ENCOUNTER — OFFICE VISIT (OUTPATIENT)
Dept: SURGERY | Facility: CLINIC | Age: 50
End: 2025-01-08
Payer: COMMERCIAL

## 2025-01-08 VITALS
DIASTOLIC BLOOD PRESSURE: 72 MMHG | BODY MASS INDEX: 25.98 KG/M2 | HEART RATE: 78 BPM | SYSTOLIC BLOOD PRESSURE: 111 MMHG | WEIGHT: 196 LBS | HEIGHT: 73 IN

## 2025-01-08 DIAGNOSIS — K40.20 BILATERAL INGUINAL HERNIA WITHOUT OBSTRUCTION OR GANGRENE, RECURRENCE NOT SPECIFIED: Primary | ICD-10-CM

## 2025-01-08 PROCEDURE — 99204 OFFICE O/P NEW MOD 45 MIN: CPT | Performed by: SURGERY

## 2025-01-08 NOTE — H&P
History and Physical      HPI       HPI  Antonio Chou is a 49 year old male who presents with a right inguinal hernia.  He had bilateral vasectomy complicated by right scrotal hematoma.  He noticed a large bulge on the right side which is more of a nuisance.  No obstruction.  Smaller bulge on the left    Past Medical History:    Bronchiectasis (HCC)    Finger infection    Glaucoma    Hyperlipidemia    Multiple allergies    Unspecified essential hypertension     Past Surgical History:   Procedure Laterality Date    Other surgical history      chest tube, right lung surgery     Other surgical history      pilonidal cyst     Current Outpatient Medications   Medication Sig Dispense Refill    mupirocin 2 % External Ointment APPLY THREE TIMES A DAY TO THE TIP OF THE NOSE. (Patient not taking: Reported on 12/19/2024)      montelukast 10 MG Oral Tab Take 1 tablet (10 mg total) by mouth nightly. 90 tablet 3    brimonidine 0.2 % Ophthalmic Solution 2 (two) times a day.      cyclobenzaprine 10 MG Oral Tab Take 1 tablet (10 mg total) by mouth nightly. 30 tablet 1    fluticasone propionate 50 MCG/ACT Nasal Suspension 1 spray by Nasal route 2 (two) times daily. 48 g 3    simvastatin 20 MG Oral Tab Take 1 tablet (20 mg total) by mouth nightly. 90 tablet 3    lisinopril-hydroCHLOROthiazide 20-12.5 MG Oral Tab Take 1 tablet by mouth daily. 90 tablet 3    dorzolamide 2 % Ophthalmic Solution 2 (two) times a day.      brimonidine 0.1 % Ophthalmic Solution 1 drop every 8 (eight) hours.      albuterol 108 (90 Base) MCG/ACT Inhalation Aero Soln USE 2 INHALATIONS EVERY 4 TO 6 HOURS AS NEEDED FOR SHORTNESS OF BREATH OR WHEEZING 25.5 g 3    albuterol (2.5 MG/3ML) 0.083% Inhalation Nebu Soln Take 3 mL (2.5 mg total) by nebulization every 6 (six) hours as needed for Wheezing. 90 each 5    latanoprost (XALATAN) 0.005 % Ophthalmic Solution nightly.       ALLERGIES  Allergies[1]    Social History     Socioeconomic History    Marital status:  Life Partner    Number of children: 2   Occupational History    Occupation: recruiting   Tobacco Use    Smoking status: Never    Smokeless tobacco: Never   Vaping Use    Vaping status: Never Used   Substance and Sexual Activity    Alcohol use: Yes     Alcohol/week: 0.0 standard drinks of alcohol     Comment: Beer & wine, 3 drinks weekly    Drug use: Never     Family History   Problem Relation Age of Onset    Hypertension Father     Stroke Father         (cause of death)    Heart Disorder Father     Hypertension Mother     Lipids Mother     Cancer Maternal Grandmother         brain cancer, colon cancer.     Hypertension Maternal Grandmother     Heart Disorder Maternal Grandmother     Dementia Maternal Grandmother     Dementia Paternal Grandmother     Heart Disorder Paternal Grandfather     Genetic Disease Paternal Grandfather        Review of Systems   A comprehensive 10 point review of systems was completed.  Pertinent positives and negatives noted in the the HPI.    PHYSICAL EXAM   There were no vitals taken for this visit. No LMP for male patient.   Constitutional: appears well hydrated alert and responsive no acute distress noted  Head/Face: normocephalic  Nose/Mouth/Throat: nose and throat are clear palate is intact mucous membranes are moist no oral lesions are noted  Neck/Thyroid: neck is supple without adenopathy  Respiratory: normal to inspection lungs are clear to auscultation bilaterally normal respiratory effort  Cardiovascular: regular rate and rhythm no murmurs, gallups, or rubs  Abdomen: soft non-tender non-distended no organomegaly noted no masses  Large right direct inguinal hernia smaller left direct inguinal hernia both reducible  Extremities: no edema, cyanosis, or clubbing  Neurological: exam appropriate for age reflexes and motor skills appropriate for age      ASSESSMENT/PLAN   Assessment   Encounter Diagnosis   Name Primary?    Inguinal hernia, right Yes       49 year old male with bilateral  inguinal hernia  We have discussed the surgical risks, benefits, alternatives, and expected recovery. We will plan robotic repair bilateral inguinal hernia with mesh. All of the patient's questions have been answered to his satisfaction.       1/8/2025  Kashif Guy MD               [1]   Allergies  Allergen Reactions    Seasonal Runny nose

## 2025-01-18 ENCOUNTER — LAB ENCOUNTER (OUTPATIENT)
Dept: LAB | Age: 50
End: 2025-01-18
Attending: SURGERY
Payer: COMMERCIAL

## 2025-01-18 DIAGNOSIS — K40.20 BILATERAL INGUINAL HERNIA WITHOUT OBSTRUCTION OR GANGRENE, RECURRENCE NOT SPECIFIED: ICD-10-CM

## 2025-01-18 LAB
ATRIAL RATE: 68 BPM
P AXIS: 62 DEGREES
P-R INTERVAL: 152 MS
Q-T INTERVAL: 366 MS
QRS DURATION: 98 MS
QTC CALCULATION (BEZET): 389 MS
R AXIS: 48 DEGREES
T AXIS: 30 DEGREES
VENTRICULAR RATE: 68 BPM

## 2025-01-18 PROCEDURE — 93010 ELECTROCARDIOGRAM REPORT: CPT | Performed by: INTERNAL MEDICINE

## 2025-01-18 PROCEDURE — 93005 ELECTROCARDIOGRAM TRACING: CPT

## 2025-01-20 NOTE — DISCHARGE INSTRUCTIONS
Ice pack as needed.    May shower in 24 hours.  Boxer briefs or scrotal supporter for 1 week to minimize swelling  15 pound lifting restriction.   May drive in 1 week or sooner if pain-free  Remove outer Band-Aids in 24 hours leave white strips for 1 week  Ibuprofen 600 mg every 6 hours for pain, Norco for breakthrough pain  Take Colace as a stool softener while taking Norco.    Follow-up with PA in 2 weeks for wound check     HOME INSTRUCTIONS  [facility] HOME CARE INSTRUCTIONS: POST-OP ANESTHESIA  The medication that you received for sedation or general anesthesia can last up to 24 hours. Your judgment and reflexes may be altered, even if you feel like your normal self.      We Recommend:   Do not drive any motor vehicle or bicycle   Avoid mowing the lawn, playing sports, or working with power tools/applicances (power saws, electric knives or mixers)   That you have someone stay with you on your first night home   Do not drink alcohol or take sleeping pills or tranquilizers   Do not sign legal documents within 24 hours of your procedure   If you had a nerve block for your surgery, take extra care not to put any pressure on your arm or hand for 24 hours    It is normal:  For you to have a sore throat if you had a breathing tube during surgery (while you were asleep!). The sore throat should get better within 48 hours. You can gargle with warm salt water (1/2 tsp in 4 oz warm water) or use a throat lozenge for comfort  To feel muscle aches or soreness especially in the abdomen, chest or neck. The achy feeling should go away in the next 24 hours  To feel weak, sleepy or \"wiped out\". Your should start feeling better in the next 24 hours.   To experience mild discomforts such as sore lip or tongue, headache, cramps, gas pains or a bloated feeling in your abdomen.   To experience mild back pain or soreness for a day or two if you had spinal or epidural anesthesia.   If you had laparoscopic surgery, to feel shoulder pain or  discomfort on the day of surgery.   For some patients to have nausea after surgery/anesthesia    If you feel nausea or experience vomiting:   Try to move around less.   Eat less than usual or drink only liquids until the next morning   Nausea should resolve in about 24 hours    If you have a problem when you are at home:    Call your surgeons office   Discharge Instructions: After Your Surgery  You’ve just had surgery. During surgery, you were given medicine called anesthesia to keep you relaxed and free of pain. After surgery, you may have some pain or nausea. This is common. Here are some tips for feeling better and getting well after surgery.   Going home  Your healthcare provider will show you how to take care of yourself when you go home. They'll also answer your questions. Have an adult family member or friend drive you home. For the first 24 hours after your surgery:   Don't drive or use heavy equipment.  Don't make important decisions or sign legal papers.  Take medicines as directed.  Don't drink alcohol.  Have someone stay with you, if needed. They can watch for problems and help keep you safe.  Be sure to go to all follow-up visits with your healthcare provider. And rest after your surgery for as long as your provider tells you to.   Coping with pain  If you have pain after surgery, pain medicine will help you feel better. Take it as directed, before pain becomes severe. Also, ask your healthcare provider or pharmacist about other ways to control pain. This might be with heat, ice, or relaxation. And follow any other instructions your surgeon or nurse gives you.      Stay on schedule with your medicine.     Tips for taking pain medicine  To get the best relief possible, remember these points:   Pain medicines can upset your stomach. Taking them with a little food may help.  Most pain relievers taken by mouth need at least 20 to 30 minutes to start to work.  Don't wait till your pain becomes severe before  you take your medicine. Try to time your medicine so that you can take it before starting an activity. This might be before you get dressed, go for a walk, or sit down for dinner.  Constipation is a common side effect of some pain medicines. Call your healthcare provider before taking any medicines such as laxatives or stool softeners to help ease constipation. Also ask if you should skip any foods. Drinking lots of fluids and eating foods such as fruits and vegetables that are high in fiber can also help. Remember, don't take laxatives unless your surgeon has prescribed them.  Drinking alcohol and taking pain medicine can cause dizziness and slow your breathing. It can even be deadly. Don't drink alcohol while taking pain medicine.  Pain medicine can make you react more slowly to things. Don't drive or run machinery while taking pain medicine.  Your healthcare provider may tell you to take acetaminophen to help ease your pain. Ask them how much you're supposed to take each day. Acetaminophen or other pain relievers may interact with your prescription medicines or other over-the-counter (OTC) medicines. Some prescription medicines have acetaminophen and other ingredients in them. Using both prescription and OTC acetaminophen for pain can cause you to accidentally overdose. Read the labels on your OTC medicines with care. This will help you to clearly know the list of ingredients, how much to take, and any warnings. It may also help you not take too much acetaminophen. If you have questions or don't understand the information, ask your pharmacist or healthcare provider to explain it to you before you take the OTC medicine.   Managing nausea  Some people have an upset stomach (nausea) after surgery. This is often because of anesthesia, pain, or pain medicine, less movement of food in the stomach, or the stress of surgery. These tips will help you handle nausea and eat healthy foods as you get better. If you were on a  special food plan before surgery, ask your healthcare provider if you should follow it while you get better. Check with your provider on how your eating should progress. It may depend on the surgery you had. These general tips may help:   Don't push yourself to eat. Your body will tell you when to eat and how much.  Start off with clear liquids and soup. They're easier to digest.  Next try semi-solid foods as you feel ready. These include mashed potatoes, applesauce, and gelatin.  Slowly move to solid foods. Don’t eat fatty, rich, or spicy foods at first.  Don't force yourself to have 3 large meals a day. Instead eat smaller amounts more often.  Take pain medicines with a small amount of solid food, such as crackers or toast. This helps prevent nausea.  When to call your healthcare provider  Call your healthcare provider right away if you have any of these:   You still have too much pain, or the pain gets worse, after taking the medicine. The medicine may not be strong enough. Or there may be a complication from the surgery.  You feel too sleepy, dizzy, or groggy. The medicine may be too strong.  Side effects such as nausea or vomiting. Your healthcare provider may advise taking other medicines to .  Skin changes such as rash, itching, or hives. This may mean you have an allergic reaction. Your provider may advise taking other medicines.  The incision looks different (for instance, part of it opens up).  Bleeding or fluid leaking from the incision site, and weren't told to expect that.  Fever of 100.4°F (38°C) or higher, or as directed by your provider.  Call 911  Call 911 right away if you have:   Trouble breathing  Facial swelling    If you have obstructive sleep apnea   You were given anesthesia medicine during surgery to keep you comfortable and free of pain. After surgery, you may have more apnea spells because of this medicine and other medicines you were given. The spells may last longer than normal.    At  home:  Keep using the continuous positive airway pressure (CPAP) device when you sleep. Unless your healthcare provider tells you not to, use it when you sleep, day or night. CPAP is a common device used to treat obstructive sleep apnea.  Talk with your provider before taking any pain medicine, muscle relaxants, or sedatives. Your provider will tell you about the possible dangers of taking these medicines.  Contact your provider if your sleeping changes a lot even when taking medicines as directed.  Gloria last reviewed this educational content on 10/1/2021  © 1940-1094 [employer]. All rights reserved. This information is not intended as a substitute for professional medical care. Always follow your healthcare professional's instructions.

## 2025-01-23 ENCOUNTER — ANESTHESIA EVENT (OUTPATIENT)
Dept: SURGERY | Facility: HOSPITAL | Age: 50
End: 2025-01-23
Payer: COMMERCIAL

## 2025-01-23 ENCOUNTER — ANESTHESIA (OUTPATIENT)
Dept: SURGERY | Facility: HOSPITAL | Age: 50
End: 2025-01-23
Payer: COMMERCIAL

## 2025-01-23 ENCOUNTER — HOSPITAL ENCOUNTER (OUTPATIENT)
Facility: HOSPITAL | Age: 50
Setting detail: HOSPITAL OUTPATIENT SURGERY
Discharge: HOME | End: 2025-01-23
Attending: SURGERY | Admitting: SURGERY
Payer: COMMERCIAL

## 2025-01-23 VITALS
SYSTOLIC BLOOD PRESSURE: 139 MMHG | DIASTOLIC BLOOD PRESSURE: 91 MMHG | RESPIRATION RATE: 14 BRPM | BODY MASS INDEX: 26.24 KG/M2 | TEMPERATURE: 97 F | OXYGEN SATURATION: 99 % | WEIGHT: 198 LBS | HEART RATE: 66 BPM | HEIGHT: 73 IN

## 2025-01-23 DIAGNOSIS — K40.90 INGUINAL HERNIA, RIGHT: Primary | ICD-10-CM

## 2025-01-23 PROBLEM — K40.00 BILATERAL INGUINAL HERNIA WITH OBSTRUCTION AND WITHOUT GANGRENE: Status: ACTIVE | Noted: 2025-01-23

## 2025-01-23 PROCEDURE — 0YUA4JZ SUPPLEMENT BILATERAL INGUINAL REGION WITH SYNTHETIC SUBSTITUTE, PERCUTANEOUS ENDOSCOPIC APPROACH: ICD-10-PCS | Performed by: SURGERY

## 2025-01-23 PROCEDURE — 8E0W4CZ ROBOTIC ASSISTED PROCEDURE OF TRUNK REGION, PERCUTANEOUS ENDOSCOPIC APPROACH: ICD-10-PCS | Performed by: SURGERY

## 2025-01-23 PROCEDURE — 49650 LAP ING HERNIA REPAIR INIT: CPT | Performed by: SURGERY

## 2025-01-23 DEVICE — LAPAROSCOPIC SELF-FIXATING MESH POLYESTER WITH POLYLACTIC ACID GRIPS AND COLLAGEN FILM
Type: IMPLANTABLE DEVICE | Site: GROIN | Status: FUNCTIONAL
Brand: PROGRIP

## 2025-01-23 RX ORDER — HYDROMORPHONE HYDROCHLORIDE 1 MG/ML
0.4 INJECTION, SOLUTION INTRAMUSCULAR; INTRAVENOUS; SUBCUTANEOUS EVERY 5 MIN PRN
Status: DISCONTINUED | OUTPATIENT
Start: 2025-01-23 | End: 2025-01-23

## 2025-01-23 RX ORDER — NALOXONE HYDROCHLORIDE 0.4 MG/ML
0.08 INJECTION, SOLUTION INTRAMUSCULAR; INTRAVENOUS; SUBCUTANEOUS AS NEEDED
Status: DISCONTINUED | OUTPATIENT
Start: 2025-01-23 | End: 2025-01-23

## 2025-01-23 RX ORDER — LIDOCAINE HYDROCHLORIDE 10 MG/ML
INJECTION, SOLUTION EPIDURAL; INFILTRATION; INTRACAUDAL; PERINEURAL AS NEEDED
Status: DISCONTINUED | OUTPATIENT
Start: 2025-01-23 | End: 2025-01-23 | Stop reason: SURG

## 2025-01-23 RX ORDER — ACETAMINOPHEN 500 MG
1000 TABLET ORAL ONCE
Status: COMPLETED | OUTPATIENT
Start: 2025-01-23 | End: 2025-01-23

## 2025-01-23 RX ORDER — BUPIVACAINE HYDROCHLORIDE 2.5 MG/ML
INJECTION, SOLUTION EPIDURAL; INFILTRATION; INTRACAUDAL AS NEEDED
Status: DISCONTINUED | OUTPATIENT
Start: 2025-01-23 | End: 2025-01-23 | Stop reason: HOSPADM

## 2025-01-23 RX ORDER — DEXAMETHASONE SODIUM PHOSPHATE 4 MG/ML
VIAL (ML) INJECTION AS NEEDED
Status: DISCONTINUED | OUTPATIENT
Start: 2025-01-23 | End: 2025-01-23 | Stop reason: SURG

## 2025-01-23 RX ORDER — SODIUM CHLORIDE, SODIUM LACTATE, POTASSIUM CHLORIDE, CALCIUM CHLORIDE 600; 310; 30; 20 MG/100ML; MG/100ML; MG/100ML; MG/100ML
INJECTION, SOLUTION INTRAVENOUS CONTINUOUS
Status: DISCONTINUED | OUTPATIENT
Start: 2025-01-23 | End: 2025-01-23

## 2025-01-23 RX ORDER — HYDROMORPHONE HYDROCHLORIDE 1 MG/ML
0.2 INJECTION, SOLUTION INTRAMUSCULAR; INTRAVENOUS; SUBCUTANEOUS EVERY 5 MIN PRN
Status: DISCONTINUED | OUTPATIENT
Start: 2025-01-23 | End: 2025-01-23

## 2025-01-23 RX ORDER — HYDROMORPHONE HYDROCHLORIDE 1 MG/ML
0.6 INJECTION, SOLUTION INTRAMUSCULAR; INTRAVENOUS; SUBCUTANEOUS EVERY 5 MIN PRN
Status: DISCONTINUED | OUTPATIENT
Start: 2025-01-23 | End: 2025-01-23

## 2025-01-23 RX ORDER — MIDAZOLAM HYDROCHLORIDE 1 MG/ML
INJECTION INTRAMUSCULAR; INTRAVENOUS AS NEEDED
Status: DISCONTINUED | OUTPATIENT
Start: 2025-01-23 | End: 2025-01-23 | Stop reason: SURG

## 2025-01-23 RX ORDER — IBUPROFEN 600 MG/1
600 TABLET, FILM COATED ORAL EVERY 6 HOURS PRN
Qty: 15 TABLET | Refills: 1 | Status: SHIPPED | OUTPATIENT
Start: 2025-01-23 | End: 2025-01-30

## 2025-01-23 RX ORDER — MAGNESIUM HYDROXIDE 1200 MG/15ML
LIQUID ORAL CONTINUOUS PRN
Status: DISCONTINUED | OUTPATIENT
Start: 2025-01-23 | End: 2025-01-23

## 2025-01-23 RX ORDER — HYDROCODONE BITARTRATE AND ACETAMINOPHEN 5; 325 MG/1; MG/1
1 TABLET ORAL ONCE
Status: COMPLETED | OUTPATIENT
Start: 2025-01-23 | End: 2025-01-23

## 2025-01-23 RX ORDER — DOCUSATE SODIUM 100 MG/1
100 CAPSULE, LIQUID FILLED ORAL 2 TIMES DAILY
Qty: 30 CAPSULE | Refills: 0 | Status: SHIPPED | OUTPATIENT
Start: 2025-01-23

## 2025-01-23 RX ORDER — HYDROCODONE BITARTRATE AND ACETAMINOPHEN 5; 325 MG/1; MG/1
1 TABLET ORAL EVERY 6 HOURS PRN
Qty: 20 TABLET | Refills: 0 | Status: SHIPPED | OUTPATIENT
Start: 2025-01-23

## 2025-01-23 RX ORDER — ONDANSETRON 2 MG/ML
INJECTION INTRAMUSCULAR; INTRAVENOUS AS NEEDED
Status: DISCONTINUED | OUTPATIENT
Start: 2025-01-23 | End: 2025-01-23 | Stop reason: SURG

## 2025-01-23 RX ORDER — ROCURONIUM BROMIDE 10 MG/ML
INJECTION, SOLUTION INTRAVENOUS AS NEEDED
Status: DISCONTINUED | OUTPATIENT
Start: 2025-01-23 | End: 2025-01-23 | Stop reason: SURG

## 2025-01-23 RX ORDER — GLYCOPYRROLATE 0.2 MG/ML
INJECTION, SOLUTION INTRAMUSCULAR; INTRAVENOUS AS NEEDED
Status: DISCONTINUED | OUTPATIENT
Start: 2025-01-23 | End: 2025-01-23 | Stop reason: SURG

## 2025-01-23 RX ADMIN — DEXAMETHASONE SODIUM PHOSPHATE 4 MG: 4 MG/ML VIAL (ML) INJECTION at 09:08:00

## 2025-01-23 RX ADMIN — ROCURONIUM BROMIDE 50 MG: 10 INJECTION, SOLUTION INTRAVENOUS at 08:47:00

## 2025-01-23 RX ADMIN — LIDOCAINE HYDROCHLORIDE 50 MG: 10 INJECTION, SOLUTION EPIDURAL; INFILTRATION; INTRACAUDAL; PERINEURAL at 08:47:00

## 2025-01-23 RX ADMIN — ONDANSETRON 4 MG: 2 INJECTION INTRAMUSCULAR; INTRAVENOUS at 09:08:00

## 2025-01-23 RX ADMIN — GLYCOPYRROLATE 0.2 MG: 0.2 INJECTION, SOLUTION INTRAMUSCULAR; INTRAVENOUS at 08:44:00

## 2025-01-23 RX ADMIN — MIDAZOLAM HYDROCHLORIDE 2 MG: 1 INJECTION INTRAMUSCULAR; INTRAVENOUS at 08:44:00

## 2025-01-23 NOTE — ANESTHESIA POSTPROCEDURE EVALUATION
Patient: Antonio Chou    Procedure Summary       Date: 01/23/25 Room / Location: [facility] MAIN OR  / [facility] MAIN OR    Anesthesia Start: 0844 Anesthesia Stop: 1010    Procedure: Robotic right repair inguinal and left hernia repair with mesh (Bilateral: Groin) Diagnosis:       Bilateral inguinal hernia without obstruction or gangrene, recurrence not specified      (Bilateral inguinal hernia without obstruction or gangrene, recurrence not specified [K40.20])    Surgeons: Kashif Guy MD Anesthesiologist: Tracey Tejeda MD    Anesthesia Type: general ASA Status: 2            Anesthesia Type: general    Vitals Value Taken Time   /100 01/23/25 1016   Temp 97.4 °F (36.3 °C) 01/23/25 1006   Pulse 60 01/23/25 1024   Resp 12 01/23/25 1024   SpO2 94 % 01/23/25 1024   Vitals shown include unfiled device data.    EM AN Post Evaluation:   Patient Evaluated in PACU  Patient Participation: complete - patient participated  Level of Consciousness: awake  Pain Score: 0  Pain Management: adequate  Airway Patency:patent  Dental exam unchanged from preop  Yes    Nausea/Vomiting: none  Cardiovascular Status: acceptable  Respiratory Status: acceptable  Postoperative Hydration acceptable      Tracey Tejeda MD  1/23/2025 10:24 AM

## 2025-01-23 NOTE — ANESTHESIA PREPROCEDURE EVALUATION
Anesthesia PreOp Note    HPI:     Antonio Chou is a 49 year old male who presents for preoperative consultation requested by: Kashif Guy MD    Date of Surgery: 1/23/2025    Procedure(s):  Robotic right repair inguinal and left hernia repair with mesh  Indication: Bilateral inguinal hernia without obstruction or gangrene, recurrence not specified [K40.20]    Relevant Problems   No relevant active problems       NPO:  Last Liquid Consumption Date: 01/22/25  Last Liquid Consumption Time: 2100  Last Solid Consumption Date: 01/22/25  Last Solid Consumption Time: 1900  Last Liquid Consumption Date: 01/22/25          History Review:  Patient Active Problem List    Diagnosis Date Noted    Acquired trigger finger of left middle finger 12/19/2024    Inguinal hernia, right 12/19/2024    Strain of lumbar region 04/29/2024    Annual physical exam 11/22/2021    Essential hypertension, benign 11/22/2021    Pure hypercholesterolemia 11/22/2021    Colon cancer screening 11/22/2021    Routine adult health maintenance 11/23/2020    Screening for prostate cancer 11/23/2020    Bronchiectasis without complication (HCC) 08/27/2020    Allergic rhinitis 08/27/2020    Cough 03/05/2020       Past Medical History:    Bronchiectasis (HCC)    Finger infection    Glaucoma    High blood pressure    High cholesterol    Hyperlipidemia    Multiple allergies    Unspecified essential hypertension    Visual impairment    glasses       Past Surgical History:   Procedure Laterality Date    Other surgical history      chest tube, right lung surgery     Other surgical history      pilonidal cyst       Prescriptions Prior to Admission[1]  Current Medications and Prescriptions Ordered in Epic[2]    Allergies[3]    Family History   Problem Relation Age of Onset    Hypertension Father     Stroke Father         (cause of death)    Heart Disorder Father     Hypertension Mother     Lipids Mother     Cancer Maternal Grandmother         brain cancer,  colon cancer.     Hypertension Maternal Grandmother     Heart Disorder Maternal Grandmother     Dementia Maternal Grandmother     Dementia Paternal Grandmother     Heart Disorder Paternal Grandfather     Genetic Disease Paternal Grandfather      Social History     Socioeconomic History    Marital status: Life Partner    Number of children: 2   Occupational History    Occupation: recruiting   Tobacco Use    Smoking status: Former     Types: Cigarettes    Smokeless tobacco: Never   Vaping Use    Vaping status: Never Used   Substance and Sexual Activity    Alcohol use: Yes     Comment: 2 x weekly    Drug use: Never   Other Topics Concern    Caffeine Concern Yes     Comment: Tea, 4 X a week       Available pre-op labs reviewed.             Vital Signs:  Body mass index is 26.12 kg/m².   height is 1.854 m (6' 1\") and weight is 89.8 kg (198 lb). His oral temperature is 97.7 °F (36.5 °C). His blood pressure is 120/80 and his pulse is 65. His respiration is 18 and oxygen saturation is 95%.   Vitals:    01/21/25 0958 01/23/25 0745   BP:  120/80   Pulse:  65   Resp:  18   Temp:  97.7 °F (36.5 °C)   TempSrc:  Oral   SpO2:  95%   Weight: 88.9 kg (196 lb) 89.8 kg (198 lb)   Height: 1.854 m (6' 1\") 1.854 m (6' 1\")        Anesthesia Evaluation     Patient summary reviewed and Nursing notes reviewed    Airway   Mallampati: III  TM distance: <3 FB  Neck ROM: limited  Dental      Pulmonary     breath sounds clear to auscultation    ROS comment: Ex smoker  Cardiovascular   (+) hypertension    Rhythm: regular  Rate: normal    Neuro/Psych    (+)  neuromuscular disease,        GI/Hepatic/Renal      Endo/Other    Abdominal                  Anesthesia Plan:   ASA:  2  Plan:   General  Airway:  ETT  Informed Consent Plan and Risks Discussed With:  Patient  Discussed plan with:  Attending      I have informed Antonio Magdalenoer and/or legal guardian or family member of the nature of the anesthetic plan, benefits, risks including possible  dental damage if relevant, major complications, and any alternative forms of anesthetic management.   All of the patient's questions were answered to the best of my ability. The patient desires the anesthetic management as planned.  Tracey Tejeda MD  1/23/2025 8:04 AM  Present on Admission:  **None**           [1]   Medications Prior to Admission   Medication Sig Dispense Refill Last Dose/Taking    montelukast 10 MG Oral Tab Take 1 tablet (10 mg total) by mouth nightly. 90 tablet 3 1/22/2025 Bedtime    brimonidine 0.2 % Ophthalmic Solution 2 (two) times a day.   1/23/2025 Morning    fluticasone propionate 50 MCG/ACT Nasal Suspension 1 spray by Nasal route 2 (two) times daily. 48 g 3 1/22/2025 Evening    simvastatin 20 MG Oral Tab Take 1 tablet (20 mg total) by mouth nightly. 90 tablet 3 1/22/2025 Bedtime    lisinopril-hydroCHLOROthiazide 20-12.5 MG Oral Tab Take 1 tablet by mouth daily. 90 tablet 3 1/22/2025 Morning    dorzolamide 2 % Ophthalmic Solution 2 (two) times a day.   1/23/2025 Morning    latanoprost (XALATAN) 0.005 % Ophthalmic Solution nightly.   1/22/2025 Evening    brimonidine 0.1 % Ophthalmic Solution 1 drop every 8 (eight) hours.      [2]   Current Facility-Administered Medications Ordered in Epic   Medication Dose Route Frequency Provider Last Rate Last Admin    lactated ringers infusion   Intravenous Continuous Kashif Guy MD        ceFAZolin (Ancef) 2g in 10mL IV syringe premix  2 g Intravenous Once Kashif Guy MD         No current [facility]-ordered outpatient medications on file.   [3]   Allergies  Allergen Reactions    Seasonal Runny nose

## 2025-01-23 NOTE — ANESTHESIA PROCEDURE NOTES
Airway  Date/Time: 1/23/2025 8:48 AM  Urgency: elective    Airway not difficult    General Information and Staff    Patient location during procedure: OR  Anesthesiologist: Tracey Tejeda MD  Performed: anesthesiologist   Performed by: Tracey Tejeda MD  Authorized by: Tracey Tejeda MD      Indications and Patient Condition  Indications for airway management: anesthesia  Spontaneous Ventilation: absent  Sedation level: deep  Preoxygenated: yes  Patient position: sniffing  Mask difficulty assessment: 1 - vent by mask    Final Airway Details  Final airway type: endotracheal airway      Successful airway: ETT  Cuffed: yes   Successful intubation technique: Video laryngoscopy  Endotracheal tube insertion site: oral  Blade: Vlad  Blade size: #4  ETT size (mm): 7.5    Placement verified by: capnometry   Measured from: lips  ETT to lips (cm): 23  Number of attempts at approach: 1  Number of other approaches attempted: 0

## 2025-01-23 NOTE — OPERATIVE REPORT
[facility]    PATIENT'S NAME: GENNARO HOFF   ATTENDING PHYSICIAN: Kashif Guy MD   OPERATING PHYSICIAN: Kashif Gyu MD   PATIENT ACCOUNT#:   [id number]    LOCATION:  [facility]  MEDICAL RECORD #:   [id number]       YOB: 1975  ADMISSION DATE:       01/23/2025      OPERATION DATE:  01/23/2025    OPERATIVE REPORT      PREOPERATIVE DIAGNOSIS:  Bilateral direct inguinal hernia.  POSTOPERATIVE DIAGNOSIS:  Bilateral direct inguinal hernia.  PROCEDURE:  Robotic repair of bilateral direct inguinal hernia with ProGrip mesh.    ASSISTANT:  RICHIE Atkinson.    ESTIMATED BLOOD LOSS:  5 mL.    COMPLICATIONS:  None.    ANESTHESIA:  General.    DISPOSITION:  To Recovery, tolerated well.    INDICATIONS:  The patient is 49 with bilateral symptomatic hernias.  Consent obtained.     OPERATIVE TECHNIQUE:  He is taken to surgery.  He is prepped and draped in the usual sterile fashion.  Veress needle placed at Caldera's point.  Abdomen insufflated.  Three trocars are placed using laparoscopic technique.  The robot is docked.  The patient was in 20 degrees Trendelenburg position.  Bilateral direct inguinal hernias are identified.  Peritoneal flaps are elevated widely.  The bladder is mobilized inferiorly.  Cord and its structures encompassed from the sacs bilaterally.  Peritoneal flap is elevated wider.  Then 10 x 15 ProGrip are placed on either side and overlapped in the midline.  Opened and secured.  Peritoneum closed using running 2-0 V-Loc.  Hemostasis assured.  Trocars removed.  Skin closed with 3-0 Monocryl, and Marcaine infiltrated for local block.    Dictated By Kashif Guy MD  d: 01/23/2025 09:50:27  t: 01/23/2025 14:11:38  Job [id number]  GL/    cc: Mario Snider MD

## 2025-01-23 NOTE — INTERVAL H&P NOTE
Pre-op Diagnosis: Bilateral inguinal hernia without obstruction or gangrene, recurrence not specified [K40.20]    The above referenced H&P was reviewed by Kashif Guy MD on 1/23/2025, the patient was examined and no significant changes have occurred in the patient's condition since the H&P was performed.  I discussed with the patient and/or legal representative the potential benefits, risks and side effects of this procedure; the likelihood of the patient achieving goals; and potential problems that might occur during recuperation.  I discussed reasonable alternatives to the procedure, including risks, benefits and side effects related to the alternatives and risks related to not receiving this procedure.  We will proceed with procedure as planned.

## 2025-02-10 ENCOUNTER — OFFICE VISIT (OUTPATIENT)
Dept: SURGERY | Facility: CLINIC | Age: 50
End: 2025-02-10
Payer: COMMERCIAL

## 2025-02-10 VITALS
HEIGHT: 73 IN | BODY MASS INDEX: 25.84 KG/M2 | WEIGHT: 195 LBS | DIASTOLIC BLOOD PRESSURE: 70 MMHG | SYSTOLIC BLOOD PRESSURE: 120 MMHG

## 2025-02-10 DIAGNOSIS — Z48.89 ENCOUNTER FOR POSTOPERATIVE CARE: Primary | ICD-10-CM

## 2025-02-10 PROCEDURE — 99024 POSTOP FOLLOW-UP VISIT: CPT

## 2025-02-10 NOTE — PROGRESS NOTES
Follow Up Visit Note       Active Problems      No diagnosis found.      Chief Complaint   Chief Complaint   Patient presents with    Post-Op     Patient is here for post op Robotic Right Inguinal Hernia repair with mesh on 1-.  The patient had some post op bruising, bloating and constipation.  These have resoled now. The patient has returned to sedentary work.              History of Present Illness  Antonio Chou is a pleasant 49 year old year old patient presenting for post op appointment. He generally feels well, he denies abdominal pain. Patient reports some constipation and bloating which has slowly been improving. He noted post-operative bruising which has since resolved. He is tolerating a general diet without diarrhea. He denies fever, chills, SOB, chest pain, leg swelling or calf tenderness.       Allergies  Antonio is allergic to seasonal.    Past Medical / Surgical / Social / Family History    The past medical and past surgical history have been reviewed by me today.    Past Medical History:    Bronchiectasis (HCC)    Finger infection    Glaucoma    High blood pressure    High cholesterol    Hyperlipidemia    Multiple allergies    Unspecified essential hypertension    Visual impairment    glasses     Past Surgical History:   Procedure Laterality Date    Inguinal hernia repair  01/23/2025    Robotic repair of Right Inguinal Hernia with mesh    Other surgical history      chest tube, right lung surgery     Other surgical history      pilonidal cyst       The family history and social history have been reviewed by me today.    Family History   Problem Relation Age of Onset    Hypertension Father     Stroke Father         (cause of death)    Heart Disorder Father     Hypertension Mother     Lipids Mother     Cancer Maternal Grandmother         brain cancer, colon cancer.     Hypertension Maternal Grandmother     Heart Disorder Maternal Grandmother     Dementia Maternal Grandmother     Dementia  Paternal Grandmother     Heart Disorder Paternal Grandfather     Genetic Disease Paternal Grandfather      Social History     Socioeconomic History    Marital status: Life Partner    Number of children: 2   Occupational History    Occupation: recruiting   Tobacco Use    Smoking status: Former     Types: Cigarettes    Smokeless tobacco: Never   Vaping Use    Vaping status: Never Used   Substance and Sexual Activity    Alcohol use: Yes     Comment: 2 x weekly    Drug use: Never   Other Topics Concern    Caffeine Concern Yes     Comment: Tea, 4 X a week        Current Outpatient Medications:     HYDROcodone-acetaminophen 5-325 MG Oral Tab, Take 1 tablet by mouth every 6 (six) hours as needed., Disp: 20 tablet, Rfl: 0    docusate sodium 100 MG Oral Cap, Take 1 capsule (100 mg total) by mouth 2 (two) times daily., Disp: 30 capsule, Rfl: 0    montelukast 10 MG Oral Tab, Take 1 tablet (10 mg total) by mouth nightly., Disp: 90 tablet, Rfl: 3    fluticasone propionate 50 MCG/ACT Nasal Suspension, 1 spray by Nasal route 2 (two) times daily., Disp: 48 g, Rfl: 3    simvastatin 20 MG Oral Tab, Take 1 tablet (20 mg total) by mouth nightly., Disp: 90 tablet, Rfl: 3    lisinopril-hydroCHLOROthiazide 20-12.5 MG Oral Tab, Take 1 tablet by mouth daily., Disp: 90 tablet, Rfl: 3    dorzolamide 2 % Ophthalmic Solution, 2 (two) times a day., Disp: , Rfl:     brimonidine 0.1 % Ophthalmic Solution, 1 drop every 8 (eight) hours., Disp: , Rfl:      Review of Systems  The Review of Systems has been reviewed by me during today.  Review of Systems   Constitutional:  Negative for appetite change, chills, fatigue and fever.   Gastrointestinal:  Negative for abdominal distention, abdominal pain, constipation, diarrhea, nausea and vomiting.        Physical Findings   /70 (BP Location: Left arm)   Ht 6' 1\" (1.854 m)   Wt 195 lb (88.5 kg)   BMI 25.73 kg/m²   Physical Exam  Constitutional:       Appearance: Normal appearance. He is normal  weight.   Cardiovascular:      Rate and Rhythm: Normal rate and regular rhythm.      Pulses: Normal pulses.   Pulmonary:      Effort: Pulmonary effort is normal.      Breath sounds: Normal breath sounds.   Abdominal:      General: Abdomen is flat. A surgical scar is present. Bowel sounds are normal.      Palpations: Abdomen is soft.      Tenderness: There is no abdominal tenderness.   Skin:     General: Skin is warm and dry.      Capillary Refill: Capillary refill takes less than 2 seconds.   Neurological:      Mental Status: He is alert.   Psychiatric:         Mood and Affect: Mood normal.         Behavior: Behavior normal.         Thought Content: Thought content normal.          Assessment   No diagnosis found.  Pathology reviewed with the patient    Plan   Continue avoiding heavy lifting for another 2-4 weeks  Continue good hygiene of incision site  OK to swim or take a bath  Continue small non-fatty, soft, bland meals and adequate hydration  Follow up as needed       No orders of the defined types were placed in this encounter.      Imaging & Referrals   None    Follow Up  No follow-ups on file.    Collette Mondragon PA-C

## 2025-02-27 RX ORDER — LISINOPRIL AND HYDROCHLOROTHIAZIDE 12.5; 2 MG/1; MG/1
1 TABLET ORAL DAILY
Qty: 90 TABLET | Refills: 1 | Status: SHIPPED | OUTPATIENT
Start: 2025-02-27

## 2025-03-03 ENCOUNTER — LAB ENCOUNTER (OUTPATIENT)
Dept: LAB | Age: 50
End: 2025-03-03
Attending: INTERNAL MEDICINE
Payer: COMMERCIAL

## 2025-03-03 DIAGNOSIS — Z00.00 ANNUAL PHYSICAL EXAM: ICD-10-CM

## 2025-03-03 LAB
ALBUMIN SERPL-MCNC: 4.7 G/DL (ref 3.2–4.8)
ALBUMIN/GLOB SERPL: 1.7 {RATIO} (ref 1–2)
ALP LIVER SERPL-CCNC: 90 U/L
ALT SERPL-CCNC: 38 U/L
ANION GAP SERPL CALC-SCNC: 8 MMOL/L (ref 0–18)
AST SERPL-CCNC: 22 U/L (ref ?–34)
BASOPHILS # BLD AUTO: 0.04 X10(3) UL (ref 0–0.2)
BASOPHILS NFR BLD AUTO: 0.7 %
BILIRUB SERPL-MCNC: 0.8 MG/DL (ref 0.3–1.2)
BUN BLD-MCNC: 11 MG/DL (ref 9–23)
BUN/CREAT SERPL: 13.4 (ref 10–20)
CALCIUM BLD-MCNC: 9.2 MG/DL (ref 8.7–10.4)
CHLORIDE SERPL-SCNC: 101 MMOL/L (ref 98–112)
CHOLEST SERPL-MCNC: 173 MG/DL (ref ?–200)
CO2 SERPL-SCNC: 28 MMOL/L (ref 21–32)
COMPLEXED PSA SERPL-MCNC: 0.38 NG/ML (ref ?–4)
CREAT BLD-MCNC: 0.82 MG/DL
DEPRECATED RDW RBC AUTO: 42 FL (ref 35.1–46.3)
EGFRCR SERPLBLD CKD-EPI 2021: 107 ML/MIN/1.73M2 (ref 60–?)
EOSINOPHIL # BLD AUTO: 0.31 X10(3) UL (ref 0–0.7)
EOSINOPHIL NFR BLD AUTO: 5.1 %
ERYTHROCYTE [DISTWIDTH] IN BLOOD BY AUTOMATED COUNT: 12.9 % (ref 11–15)
EST. AVERAGE GLUCOSE BLD GHB EST-MCNC: 103 MG/DL (ref 68–126)
FASTING PATIENT LIPID ANSWER: YES
FASTING STATUS PATIENT QL REPORTED: YES
GLOBULIN PLAS-MCNC: 2.7 G/DL (ref 2–3.5)
GLUCOSE BLD-MCNC: 81 MG/DL (ref 70–99)
HBA1C MFR BLD: 5.2 % (ref ?–5.7)
HCT VFR BLD AUTO: 44.8 %
HDLC SERPL-MCNC: 44 MG/DL (ref 40–59)
HGB BLD-MCNC: 15.3 G/DL
IMM GRANULOCYTES # BLD AUTO: 0.01 X10(3) UL (ref 0–1)
IMM GRANULOCYTES NFR BLD: 0.2 %
LDLC SERPL CALC-MCNC: 94 MG/DL (ref ?–100)
LYMPHOCYTES # BLD AUTO: 1.34 X10(3) UL (ref 1–4)
LYMPHOCYTES NFR BLD AUTO: 22.1 %
MCH RBC QN AUTO: 30.1 PG (ref 26–34)
MCHC RBC AUTO-ENTMCNC: 34.2 G/DL (ref 31–37)
MCV RBC AUTO: 88 FL
MONOCYTES # BLD AUTO: 0.84 X10(3) UL (ref 0.1–1)
MONOCYTES NFR BLD AUTO: 13.8 %
NEUTROPHILS # BLD AUTO: 3.53 X10 (3) UL (ref 1.5–7.7)
NEUTROPHILS # BLD AUTO: 3.53 X10(3) UL (ref 1.5–7.7)
NEUTROPHILS NFR BLD AUTO: 58.1 %
NONHDLC SERPL-MCNC: 129 MG/DL (ref ?–130)
OSMOLALITY SERPL CALC.SUM OF ELEC: 282 MOSM/KG (ref 275–295)
PLATELET # BLD AUTO: 231 10(3)UL (ref 150–450)
POTASSIUM SERPL-SCNC: 3.7 MMOL/L (ref 3.5–5.1)
PROT SERPL-MCNC: 7.4 G/DL (ref 5.7–8.2)
RBC # BLD AUTO: 5.09 X10(6)UL
SODIUM SERPL-SCNC: 137 MMOL/L (ref 136–145)
T4 FREE SERPL-MCNC: 1.3 NG/DL (ref 0.8–1.7)
TRIGL SERPL-MCNC: 208 MG/DL (ref 30–149)
TSI SER-ACNC: 1.43 UIU/ML (ref 0.55–4.78)
VLDLC SERPL CALC-MCNC: 34 MG/DL (ref 0–30)
WBC # BLD AUTO: 6.1 X10(3) UL (ref 4–11)

## 2025-03-03 PROCEDURE — 80061 LIPID PANEL: CPT | Performed by: INTERNAL MEDICINE

## 2025-03-03 PROCEDURE — 84439 ASSAY OF FREE THYROXINE: CPT | Performed by: INTERNAL MEDICINE

## 2025-03-03 PROCEDURE — 85025 COMPLETE CBC W/AUTO DIFF WBC: CPT | Performed by: INTERNAL MEDICINE

## 2025-03-03 PROCEDURE — 80053 COMPREHEN METABOLIC PANEL: CPT | Performed by: INTERNAL MEDICINE

## 2025-03-03 PROCEDURE — 84443 ASSAY THYROID STIM HORMONE: CPT | Performed by: INTERNAL MEDICINE

## 2025-03-03 PROCEDURE — 83036 HEMOGLOBIN GLYCOSYLATED A1C: CPT | Performed by: INTERNAL MEDICINE

## 2025-03-03 PROCEDURE — 36415 COLL VENOUS BLD VENIPUNCTURE: CPT | Performed by: INTERNAL MEDICINE

## 2025-03-03 NOTE — TELEPHONE ENCOUNTER
Sent patient [employer] message, a follow up is needed for further refills.     Last office visit 1/23/2023

## 2025-03-04 RX ORDER — FLUTICASONE PROPIONATE 50 MCG
1 SPRAY, SUSPENSION (ML) NASAL 2 TIMES DAILY
Qty: 48 G | Refills: 3 | OUTPATIENT
Start: 2025-03-04

## 2025-03-05 RX ORDER — SIMVASTATIN 20 MG
20 TABLET ORAL NIGHTLY
Qty: 90 TABLET | Refills: 3 | Status: SHIPPED | OUTPATIENT
Start: 2025-03-05

## 2025-05-02 ENCOUNTER — HOSPITAL ENCOUNTER (OUTPATIENT)
Age: 50
Discharge: HOME | End: 2025-05-02
Payer: COMMERCIAL

## 2025-05-02 ENCOUNTER — APPOINTMENT (OUTPATIENT)
Dept: GENERAL RADIOLOGY | Age: 50
End: 2025-05-02
Attending: NURSE PRACTITIONER
Payer: COMMERCIAL

## 2025-05-02 VITALS
OXYGEN SATURATION: 99 % | SYSTOLIC BLOOD PRESSURE: 135 MMHG | RESPIRATION RATE: 20 BRPM | TEMPERATURE: 98 F | HEART RATE: 71 BPM | DIASTOLIC BLOOD PRESSURE: 77 MMHG

## 2025-05-02 DIAGNOSIS — J18.9 MULTIFOCAL PNEUMONIA: Primary | ICD-10-CM

## 2025-05-02 DIAGNOSIS — R05.9 COUGH: ICD-10-CM

## 2025-05-02 PROCEDURE — 99213 OFFICE O/P EST LOW 20 MIN: CPT | Performed by: NURSE PRACTITIONER

## 2025-05-02 PROCEDURE — 71046 X-RAY EXAM CHEST 2 VIEWS: CPT | Performed by: NURSE PRACTITIONER

## 2025-05-02 RX ORDER — AMOXICILLIN 500 MG/1
1000 TABLET, FILM COATED ORAL 3 TIMES DAILY
Qty: 30 TABLET | Refills: 0 | Status: SHIPPED | OUTPATIENT
Start: 2025-05-02 | End: 2025-05-07

## 2025-05-02 RX ORDER — AZITHROMYCIN 250 MG/1
TABLET, FILM COATED ORAL
Qty: 6 TABLET | Refills: 0 | Status: SHIPPED | OUTPATIENT
Start: 2025-05-02

## 2025-05-02 NOTE — DISCHARGE INSTRUCTIONS
Pneumonia care measures   - Take both antibiotics as directed and to completion   - You are considered contagious for 24 hours from starting antibiotics and/or for 24 hours after your fever (>100.4 ) resolves   - You may benefit from taking probiotics whenever you take antibiotics to restore good gut bacteria which is important for overall health   - Tylenol or Ibuprofen as needed for pain and/or fever   - Drink plenty of water   - Take multivitamins and Vitamin D (~2000 IU) daily, year round   - Avoid strenuous activity in the short term, and slowly progress activity as tolerated   - Deep breathing is also good for clearing the mucus from your lungs: breathe deeply five to ten times and then cough or lópez strongly a couple of times to move the mucus.   - The lungs are a major body organ and can take a few weeks / months to regain their initial capacity after an infection   - Close follow up with care team as you may require a repeat chest x-ray in 4-6 weeks to ensure resolution of infection   - Go to ED if breathing worsens

## 2025-05-02 NOTE — ED INITIAL ASSESSMENT (HPI)
Pt presents with cough,congestion with fever. Pt reports sputum is brown, blood tinged.     Last fever was 3 days ago.     Pt took Delsym and Tylenol/Motrin combo with good relief.

## 2025-05-02 NOTE — ED PROVIDER NOTES
History     Chief Complaint   Patient presents with    Cough/URI    Fever       Subjective:   HPI    Antonio Chou, 49 year old male with notable medical history of bronchiectasis, HTN who presents with cough.  Patient reports cough, congestion, fever (ended 3-days ago). Denies RAY, CP, palpitations, n/v/d. Taking Delsym and Tylenol/Motrin w/ symptom relief.       Problem List[1]   Objective:   Past Medical History:    Bronchiectasis (HCC)    Finger infection    Glaucoma    High blood pressure    High cholesterol    Hyperlipidemia    Multiple allergies    Unspecified essential hypertension    Visual impairment    glasses              Past Surgical History:   Procedure Laterality Date    Inguinal hernia repair  01/23/2025    Robotic repair of Right Inguinal Hernia with mesh    Other surgical history      chest tube, right lung surgery     Other surgical history      pilonidal cyst                Social History     Socioeconomic History    Marital status: Life Partner    Number of children: 2   Occupational History    Occupation: recruiting   Tobacco Use    Smoking status: Never    Smokeless tobacco: Never   Vaping Use    Vaping status: Never Used   Substance and Sexual Activity    Alcohol use: Yes     Comment: 2 x weekly    Drug use: Never   Other Topics Concern    Caffeine Concern Yes     Comment: Tea, 4 X a week     Social Drivers of Health      [facility]    Housing Stability              Medications Ordered Prior to Encounter[2]      Constitutional and vital signs reviewed.      All other systems reviewed and negative except as noted above.    I have reviewed the family history, social history, allergies, and outpatient medications.     History reviewed from EMR: Encounters, problem list, allergies, medications      Physical Exam     ED Triage Vitals [05/02/25 1335]   /77   Pulse 71   Resp 20   Temp 98 °F (36.7 °C)   Temp src Oral   SpO2 99 %   O2 Device None  (Room air)       Current:/77   Pulse 71   Temp 98 °F (36.7 °C) (Oral)   Resp 20   SpO2 99%       Physical Exam  Vitals and nursing note reviewed.   Constitutional:       General: He is not in acute distress.     Appearance: Normal appearance. He is normal weight. He is not ill-appearing or toxic-appearing.   HENT:      Head: Normocephalic and atraumatic.      Right Ear: External ear normal.      Left Ear: External ear normal.      Nose: Nose normal. No congestion or rhinorrhea.      Mouth/Throat:      Mouth: Mucous membranes are moist.   Eyes:      Extraocular Movements: Extraocular movements intact.      Conjunctiva/sclera: Conjunctivae normal.      Pupils: Pupils are equal, round, and reactive to light.   Cardiovascular:      Rate and Rhythm: Normal rate.      Pulses: Normal pulses.   Pulmonary:      Effort: Pulmonary effort is normal. No respiratory distress.      Breath sounds: Examination of the right-middle field reveals decreased breath sounds. Examination of the right-lower field reveals decreased breath sounds. Decreased breath sounds present.      Comments: No distress. +dry cough  Musculoskeletal:         General: No swelling, tenderness or signs of injury. Normal range of motion.      Cervical back: Normal range of motion.   Skin:     General: Skin is warm and dry.      Capillary Refill: Capillary refill takes less than 2 seconds.   Neurological:      General: No focal deficit present.      Mental Status: He is alert and oriented to person, place, and time. Mental status is at baseline.   Psychiatric:         Mood and Affect: Mood normal.         Behavior: Behavior normal.         Thought Content: Thought content normal.         Judgment: Judgment normal.            ED Course     Labs Reviewed - No data to display  XR CHEST PA + LAT CHEST (CPT=71046)   Final Result   PROCEDURE: XR CHEST PA + LAT CHEST (CPT=71046)       COMPARISON: [facility] in [place], XR CHEST PA + LAT     CHEST (CPT=71046), 4/20/2023, 1:42 PM.       INDICATIONS: Cough and congestion with shortness of breath at night for  6    days.       TECHNIQUE:   Two views.         FINDINGS:    CARDIAC/VASC: Normal cardiac silhouette.   MEDIAST/MAXIMO: Atherosclerotic aorta with no visible aneurysm.     LUNGS/PLEURA: Mild right upper lobe alveolitis with minimal patchy left    upper lobe, right perihilar and right basilar pulmonary interstitial    prominence.  Suspect multifocal pneumonitis.                                       Follow-up to resolution    BONES: Mild scoliosis with mild degenerative disc disease and spondylosis.        OTHER: Old healed right-sided rib fracture deformities.                   =====   CONCLUSION:    1. Bilateral mixed alveolar and interstitial multifocal pneumonitis ,    right upper lobe predominance.   2. Follow-up to interval resolution.                 Dictated by (CST): Jorge Carranza MD on 5/02/2025 at 2:17 PM        Finalized by (CST): Jorge Carranza MD on 5/02/2025 at 2:21 PM                  Vitals:    05/02/25 1335   BP: 135/77   Pulse: 71   Resp: 20   Temp: 98 °F (36.7 °C)   TempSrc: Oral   SpO2: 99%            [facility]        Antonio Chou, 49 year old male with medical history as noted above who presents with cough   - Patient in Nad, VSS   - viral vs post-viral cough vs PNA vs other   - CXR ordered       ** See ED course below for additional information on care provided / interventions / notable events throughout patient's encounter.      ED Course as of 05/02/25 1431  ------------------------------------------------------------  Time: 05/02 1353  Comment: Self read of CXR suspicious fot patchy opacities to Right lung when compared to previous on 4/20/23. Awaiting radiology interpretation.       ** I have independently reviewed the radiology images, clinical lab results, and ECG tracings as described above (if applicable)    ** Concerning co-morbidities possibly affecting  complaint / care: n/a        Medical Decision Making  Amount and/or Complexity of Data Reviewed  Radiology: ordered and independent interpretation performed. Decision-making details documented in ED Course.    Risk  OTC drugs.  Prescription drug management.        Disposition and Plan     Disposition:  Discharge  5/2/2025  2:30 pm    Clinical Impression:  1. Multifocal pneumonia    2. Cough            Home care instructions:    Pneumonia care measures   - Take both antibiotics as directed and to completion   - You are considered contagious for 24 hours from starting antibiotics and/or for 24 hours after your fever (>100.4 ) resolves   - You may benefit from taking probiotics whenever you take antibiotics to restore good gut bacteria which is important for overall health   - Tylenol or Ibuprofen as needed for pain and/or fever   - Drink plenty of water   - Take multivitamins and Vitamin D (~2000 IU) daily, year round   - Avoid strenuous activity in the short term, and slowly progress activity as tolerated   - Deep breathing is also good for clearing the mucus from your lungs: breathe deeply five to ten times and then cough or lópez strongly a couple of times to move the mucus.   - The lungs are a major body organ and can take a few weeks / months to regain their initial capacity after an infection   - Close follow up with care team as you may require a repeat chest x-ray in 4-6 weeks to ensure resolution of infection   - Go to ED if breathing worsens      Follow-up:  Mario Snider MD  [address] [place] [id number] [phone]                Medications Prescribed:  Current Discharge Medication List        START taking these medications    Details   amoxicillin 500 MG Oral Tab Take 2 tablets (1,000 mg total) by mouth 3 (three) times daily for 5 days.  Qty: 30 tablet, Refills: 0      azithromycin (ZITHROMAX Z-IRMA) 250 MG Oral Tab 500 mg once followed by 250 mg daily x 4 days  Qty: 6 tablet, Refills: 0                Paulie Rodríguez, DNP, APRN, AGACNP-BC, FNP-C, CNL  Adult-Gerontology Acute Care & Family Nurse Practitioner  [facility]      The above patient (and/or guardian) was made aware that an appropriate evaluation has been performed, and that no additional testing is required at this time. In my medical judgment, there is currently no evidence of an immediate life-threatening or surgical condition, therefore discharge is indicated at this time. The patient (and/or guardian) was advised that a small risk still exists that a serious condition could develop. The patient was instructed to arrange close follow-up with their primary care provider (or the referral provider given today). The patient received written and verbal instructions regarding their condition / concerns, demonstrated understanding, and is agreement with the outpatient treatment plan.              [1]   Patient Active Problem List  Diagnosis    Cough    Bronchiectasis without complication (HCC)    Allergic rhinitis    Routine adult health maintenance    Screening for prostate cancer    Annual physical exam    Essential hypertension, benign    Pure hypercholesterolemia    Colon cancer screening    Strain of lumbar region    Acquired trigger finger of left middle finger    Inguinal hernia, right    Bilateral inguinal hernia with obstruction and without gangrene   [2]   No current facility-administered medications on file prior to encounter.     Current Outpatient Medications on File Prior to Encounter   Medication Sig Dispense Refill    simvastatin 20 MG Oral Tab Take 1 tablet (20 mg total) by mouth nightly. 90 tablet 3    lisinopril-hydroCHLOROthiazide 20-12.5 MG Oral Tab Take 1 tablet by mouth daily. 90 tablet 1    HYDROcodone-acetaminophen 5-325 MG Oral Tab Take 1 tablet by mouth every 6 (six) hours as needed. 20 tablet 0    docusate sodium 100 MG Oral Cap Take 1 capsule (100 mg total) by mouth 2 (two) times daily. 30 capsule 0     montelukast 10 MG Oral Tab Take 1 tablet (10 mg total) by mouth nightly. 90 tablet 3    fluticasone propionate 50 MCG/ACT Nasal Suspension 1 spray by Nasal route 2 (two) times daily. 48 g 3    dorzolamide 2 % Ophthalmic Solution 2 (two) times a day.      brimonidine 0.1 % Ophthalmic Solution 1 drop every 8 (eight) hours.

## 2025-08-26 RX ORDER — LISINOPRIL AND HYDROCHLOROTHIAZIDE 12.5; 2 MG/1; MG/1
1 TABLET ORAL DAILY
Qty: 90 TABLET | Refills: 0 | Status: SHIPPED | OUTPATIENT
Start: 2025-08-26

## (undated) DIAGNOSIS — R93.89 ABNORMAL CHEST X-RAY: Primary | ICD-10-CM

## (undated) DEVICE — ROBOTIC: Brand: MEDLINE INDUSTRIES, INC.

## (undated) DEVICE — TUBING MEGADYNE LAPAROSCOPIC

## (undated) DEVICE — MONOPOLAR CURVED SCISSORS: Brand: ENDOWRIST

## (undated) DEVICE — BLADELESS OBTURATOR: Brand: WECK VISTA

## (undated) DEVICE — COLUMN DRAPE

## (undated) DEVICE — ARM DRAPE

## (undated) DEVICE — SUT PERMA- 2-0 30IN SH NABSRB BLK L26MM 1/

## (undated) DEVICE — CANNULA SEAL

## (undated) DEVICE — PAD POS 36IN DISP SURGYPAD

## (undated) DEVICE — VISUALIZATION SYSTEM: Brand: CLEARIFY

## (undated) DEVICE — DRAPE,ABDOMINAL,MAJOR,STERILE: Brand: MEDLINE

## (undated) DEVICE — TIP COVER ACCESSORY

## (undated) DEVICE — SOLUTION IRRIG 1000ML 0.9% NACL USP BTL

## (undated) DEVICE — C-ARM: Brand: UNBRANDED

## (undated) DEVICE — DEVICE LAP SUT PRT 150MM G 14GA TWO TRCR DEV

## (undated) DEVICE — FENESTRATED BIPOLAR FORCEPS: Brand: ENDOWRIST

## (undated) DEVICE — INSUFFLATION NEEDLE TO ESTABLISH PNEUMOPERITONEUM.: Brand: INSUFFLATION NEEDLE

## (undated) DEVICE — ABSORBABLE WOUND CLOSURE DEVICE: Brand: V-LOC 90

## (undated) DEVICE — MEGA SUTURECUT ND: Brand: ENDOWRIST

## (undated) DEVICE — SUT MCRYL 3-0 18IN PS-2 ABSRB UD 19MM 3/8 CIR

## (undated) DEVICE — GLOVE SUR 7.5 SENSICARE PI MIC PIP CRM PWD F

## (undated) NOTE — LETTER
[address]  [address] [facility] [address]      2/18/2022    Dear Olga Lidia Garcia,    It has come to our attention that a required CT CHEST test is due in 4/26/22. This test was ordered by Dr. Jemima Parham. This test requires a prior authorization. The [facility] Department at [phone] will obtain the prior authorization and contact you when it has been approved. You can schedule the test once you receive approval notification. If you have any questions please contact our office at [phone].        Thank you,        The [facility] Clinical Staff

## (undated) NOTE — LETTER
Patient Name: Makayla Li  : 1975  MRN: [id number]  Patient Address: [address] Disease 2019 (COVID-19)     [facility] is committed to the safety and well-being of our patients, mem carefully. If your symptoms get worse, call your healthcare provider immediately. 3. Get rest and stay hydrated.    4. If you have a medical appointment, call the healthcare provider ahead of time and tell them that you have or may have COVID-19.  5. For m of fever-reducing medications; and  · Improvement in respiratory symptoms (e.g., cough, shortness of breath); and  · At least 10 days have passed since symptoms first appeared OR if asymptomatic patient or date of symptom onset is unclear then use 10 days donors must:    · Have had a confirmed diagnosis of COVID-19  · Be symptom-free for at least 14 days*    *Some people will be required to have a repeat COVID-19 test in order to be eligible to donate.  If you’re instructed by Pritesh that a repeat test is r random. Researchers are trying to identify similarities between people with a Post-COVID condition to better understand if there are risk factors. How do I prevent a Post-COVID condition?   The best way to prevent the long-term symptoms of COVID-19 is

## (undated) NOTE — MR AVS SNAPSHOT
[facility]  [address] [place] [address]  [phone]               Thank you for choosing us for your health care visit with Dipesh Lundberg. Felipe Hanson MD.  We are glad to serve you and happy to provide you with this summary of your visit. Comp Metabolic Panel (14) [E]    Complete by:  As directed    Assoc Dx: Health care maintenance [Z00.00]           Lipid Panel [E]    Complete by:  As directed    Assoc Dx:   Health care maintenance [Z00.00]           PSA (Screening) [E]    Complete by:

## (undated) NOTE — MR AVS SNAPSHOT
After Visit Summary   11/22/2021    Dominick Bourgeois   MRN: [id number]           Visit Information     Date & Time  11/22/2021 10:30 AM Provider  Stacey Varner MD [address] [place] [address] , [facility] Dept.  Phone  [phone] T4(THYROXINE TOTAL) [[id number] CUSTOM]     URINE CULTURE, ROUTINE [[id number] CUSTOM]       Future Appointments        Provider Department    1/11/2022 3:00 PM JONNIE, PROCEDURE [address] GI PROCEDURE             Did you know that [employer]  primary care physicians now off